# Patient Record
Sex: FEMALE | Race: BLACK OR AFRICAN AMERICAN | Employment: FULL TIME | URBAN - METROPOLITAN AREA
[De-identification: names, ages, dates, MRNs, and addresses within clinical notes are randomized per-mention and may not be internally consistent; named-entity substitution may affect disease eponyms.]

---

## 2017-06-21 ENCOUNTER — HOSPITAL ENCOUNTER (OUTPATIENT)
Dept: PERINATAL CARE | Age: 37
Discharge: HOME | End: 2017-06-21
Attending: OBSTETRICS & GYNECOLOGY

## 2019-09-26 ENCOUNTER — OFFICE VISIT (OUTPATIENT)
Dept: FAMILY MEDICINE CLINIC | Age: 39
End: 2019-09-26

## 2019-09-26 VITALS
OXYGEN SATURATION: 97 % | DIASTOLIC BLOOD PRESSURE: 72 MMHG | BODY MASS INDEX: 42.43 KG/M2 | HEART RATE: 114 BPM | TEMPERATURE: 98.7 F | RESPIRATION RATE: 20 BRPM | SYSTOLIC BLOOD PRESSURE: 123 MMHG | WEIGHT: 264 LBS | HEIGHT: 66 IN

## 2019-09-26 DIAGNOSIS — E66.01 OBESITY, MORBID (HCC): ICD-10-CM

## 2019-09-26 DIAGNOSIS — R25.1 TREMOR OF BOTH HANDS: ICD-10-CM

## 2019-09-26 DIAGNOSIS — R53.1 WEAKNESS: ICD-10-CM

## 2019-09-26 DIAGNOSIS — E28.2 PCOS (POLYCYSTIC OVARIAN SYNDROME): Primary | ICD-10-CM

## 2019-09-26 NOTE — PROGRESS NOTES
1. Have you been to the ER, urgent care clinic since your last visit? Hospitalized since your last visit? No    2. Have you seen or consulted any other health care providers outside of the [address] [place] [address] since your last visit? Include any pap smears or colon screening.  Dr. Loren Cavanaugh, endocrine and GYN    Chief Complaint   Patient presents with   [address]     PCP

## 2019-09-26 NOTE — PROGRESS NOTES
Chief Complaint   Patient presents with   [address]     PCP     she is a 44y.o. year old female who presents for evalution. Had a baby in march, had csection and hemorrhaged after. She does not think the hgb has been chjecked after discharge  Since then she started swelling in the legs, and noted both legs feel weak when she climbs stairs  She has pcos and at one time was on metformin. She is not now but feels like the blood sugar is low    Reviewed PmHx, RxHx, FmHx, SocHx, AllgHx and updated and dated in the chart. Aspirin yes ____   No____ N/A____    Patient Active Problem List    Diagnosis    Obesity, morbid ([address]    Prediabetes     A1c 5.7 in July 2016. Nurse notes were reviewed and copied and are correct  Review of Systems - negative except as listed above in the HPI    Objective:     Vitals:    09/26/19 1430   BP: 123/72   Pulse: (!) 114   Resp: 20   Temp: 98.7 °F (37.1 °C)   TempSrc: Oral   SpO2: 97%   Weight: 264 lb (119.7 kg)   Height: 5' 6\" (1.676 m)     Physical Examination: General appearance - alert, well appearing, and in no distress  Mental status - alert, oriented to person, place, and time  Eyes - pupils equal and reactive, extraocular eye movements intact  Chest - clear to auscultation, no wheezes, rales or rhonchi, symmetric air entry  Heart - normal rate, regular rhythm, normal S1, S2, no murmurs, rubs, clicks or gallops  Abdomen - soft, nontender, nondistended, no masses or organomegaly  Neurological - bilateral hand  is weak, but evenly weak  MS on biceps, triceps and hip girdle are all normal and symmetrical  Mild hand tremore bilaterally    Extremities - peripheral pulses normal, no pedal edema, no clubbing or cyanosis  Skin - normal coloration and turgor, no rashes, no suspicious skin lesions noted         Assessment/ Plan:   Diagnoses and all orders for this visit:    1. PCOS (polycystic ovarian syndrome)  -     TSH 3RD GENERATION    2.  Obesity, morbid ([address]  - HEMOGLOBIN A1C WITH EAG    3. Weakness  -     METABOLIC PANEL, COMPREHENSIVE  -     CBC WITH AUTOMATED DIFF  -     CRP, HIGH SENSITIVITY  -     SED RATE (ESR)    4. Tremor of both hands  -     MELISSA BY MULTIPLEX FLOW IA, QL  -     RHEUMATOID FACTOR, QL  -     CRP, HIGH SENSITIVITY  -     SED RATE (ESR)           ICD-10-CM ICD-9-CM    1. PCOS (polycystic ovarian syndrome) E28.2 256.4 TSH 3RD GENERATION      CANCELED: HEMOGLOBIN A1C WITH EAG   2. Obesity, morbid ([facility] E66.01 278.01 HEMOGLOBIN A1C WITH EAG      CANCELED: METABOLIC PANEL, COMPREHENSIVE   3. Weakness F82.7 422.38 METABOLIC PANEL, COMPREHENSIVE      CBC WITH AUTOMATED DIFF      CRP, HIGH SENSITIVITY      SED RATE (ESR)      CANCELED: TSH 3RD GENERATION      CANCELED: CBC WITH AUTOMATED DIFF      CANCELED: METABOLIC PANEL, COMPREHENSIVE      CANCELED: HEMOGLOBIN A1C WITH EAG      CANCELED: REFERRAL TO NEUROLOGY      CANCELED: MELISSA BY MULTIPLEX FLOW IA, QL      CANCELED: RHEUMATOID FACTOR, QL      CANCELED: CRP, HIGH SENSITIVITY      CANCELED: SED RATE (ESR)   4. Tremor of both hands R25.1 781.0 MELISSA BY MULTIPLEX FLOW IA, QL      RHEUMATOID FACTOR, QL      CRP, HIGH SENSITIVITY      SED RATE (ESR)      CANCELED: REFERRAL TO NEUROLOGY      CANCELED: MELISSA BY MULTIPLEX FLOW IA, QL      CANCELED: RHEUMATOID FACTOR, QL      CANCELED: CRP, HIGH SENSITIVITY      CANCELED: SED RATE (ESR)       I have discussed the diagnosis with the patient and the intended plan as seen in the above orders. The patient has received an after-visit summary and questions were answered concerning future plans. Medication Side Effects and Warnings were discussed with patient: yes  Patient Labs were reviewed and or requested: yes  Patient Past Records were reviewed and or requested: yes        There are no Patient Instructions on file for this visit.     The patient verbalizes understanding and agrees with the plan of care        Patient has the advanced directives booklet to review

## 2019-10-01 ENCOUNTER — TELEPHONE (OUTPATIENT)
Dept: FAMILY MEDICINE CLINIC | Age: 39
End: 2019-10-01

## 2019-10-01 NOTE — TELEPHONE ENCOUNTER
LVM advising pt I had received her labs this morning. However, provider is out of the office until Monday. Once provider is back in office I will have her review the results and then she will be contacted with recommendations.

## 2019-10-01 NOTE — TELEPHONE ENCOUNTER
----- Message from Jessica Orozco sent at 10/1/2019  1:46 PM EDT -----  Regarding: Dr. Wilman Jacob first and last name:      Reason for call:      Callback required yes/no and why:      Best contact number(s):  [phone]      Details to clarify the request:Pt wants to speak to someone reference her test results      Jessica Orozco

## 2019-10-01 NOTE — TELEPHONE ENCOUNTER
----- Message from Lesley Alicia sent at 10/1/2019  7:49 AM EDT -----  Regarding: FW: Dr. Cleveland Rao       ----- Message -----  From: Maria R Peraza  Sent: 9/30/2019   1:00 PM EDT  To: [facility] Front Office  Subject: Dr. Jenny Alejandra first and last name:      Reason for call:Pt is inquiring about lab results.       Callback required yes/no and why:Y      Best contact number(s):  [phone]    Details to clarify the request:      Tanisha Youngblood

## 2019-10-03 ENCOUNTER — TELEPHONE (OUTPATIENT)
Dept: FAMILY MEDICINE CLINIC | Age: 39
End: 2019-10-03

## 2019-10-03 NOTE — TELEPHONE ENCOUNTER
Pt is calling in regards to her lab results completed on 9/27/19. Pt had labs completed at [facility] so they are not in the system. CRP level is 6.1. Creatinine 0.45. Protein 6.0. ALK Phosphatase 126. ALT 31. TSH 0.01. WBC 2.7. Absolute Neutrophils 780. Rheumatod factor 28. Pt is concerned about abnormal levels. Please advise of recommendations and I will contact pt to advise.

## 2019-10-03 NOTE — TELEPHONE ENCOUNTER
LVM advising pt of Dr. Sachin Holley recommendation and to schedule for Monday, Oct. 7th at 2:30 pm to discuss in office and to have additional test completed. Requested pt contact office back to confirm date/time.

## 2019-10-03 NOTE — TELEPHONE ENCOUNTER
Please put her on the schedule somewhere for Monday. To write a result note would be too wordy. I want to have her come in so I can explain tell her nothing here is life threatening in the short term.  To avoid any confusion I would prefer to discuss in office visit

## 2019-10-16 ENCOUNTER — OFFICE VISIT (OUTPATIENT)
Dept: FAMILY MEDICINE CLINIC | Age: 39
End: 2019-10-16

## 2019-10-16 VITALS
SYSTOLIC BLOOD PRESSURE: 120 MMHG | HEIGHT: 66 IN | TEMPERATURE: 98.4 F | BODY MASS INDEX: 41.14 KG/M2 | OXYGEN SATURATION: 97 % | HEART RATE: 80 BPM | DIASTOLIC BLOOD PRESSURE: 79 MMHG | RESPIRATION RATE: 18 BRPM | WEIGHT: 256 LBS

## 2019-10-16 DIAGNOSIS — R76.8 ELEVATED RHEUMATOID FACTOR: ICD-10-CM

## 2019-10-16 DIAGNOSIS — R94.5 ABNORMAL LIVER FUNCTION: Primary | ICD-10-CM

## 2019-10-16 DIAGNOSIS — R76.8 ELEVATED ANTINUCLEAR ANTIBODY (ANA) LEVEL: ICD-10-CM

## 2019-10-16 RX ORDER — PROPRANOLOL HYDROCHLORIDE 20 MG/1
TABLET ORAL
Refills: 5 | COMMUNITY
Start: 2019-10-02 | End: 2020-12-23

## 2019-10-16 RX ORDER — METHIMAZOLE 10 MG/1
25 TABLET ORAL DAILY
Refills: 5 | COMMUNITY
Start: 2019-10-11

## 2019-10-16 NOTE — PROGRESS NOTES
Chief Complaint   Patient presents with    Abnormal Lab Results     she is a 44y.o. year old female who presents for evalution. She c/o legs feeling weak  The crp and RA were elevated  Also the liver function was abn  She does not drink ETOH but is 256 lbs    She has the h/o hashimotos, right now endocrine is in the process of doing another eval        Reviewed PmHx, RxHx, FmHx, SocHx, AllgHx and updated and dated in the chart. Aspirin yes ____   No____ N/A____    Patient Active Problem List    Diagnosis    Obesity, morbid ([address]    Prediabetes     A1c 5.7 in July 2016. Nurse notes were reviewed and copied and are correct  Review of Systems - negative except as listed above in the HPI    Objective:     Vitals:    10/16/19 1501   BP: 120/79   Pulse: 80   Resp: 18   Temp: 98.4 °F (36.9 °C)   TempSrc: Oral   SpO2: 97%   Weight: 256 lb (116.1 kg)   Height: 5' 6\" (1.676 m)       Physical Examination: General appearance - alert, well appearing, and in no distress  Mental status - alert, oriented to person, place, and time  Neck - supple, no significant adenopathy  Chest - clear to auscultation, no wheezes, rales or rhonchi, symmetric air entry  Heart - normal rate, regular rhythm, normal S1, S2, no murmurs, rubs, clicks or gallops  Abdomen - soft, nontender, nondistended, no masses or organomegaly  Neurological - alert, oriented, normal speech, no focal findings or movement disorder noted  Musculoskeletal - no joint tenderness, deformity or swelling  Extremities - peripheral pulses normal, no pedal edema, no clubbing or cyanosis  Skin - normal coloration and turgor, no rashes, no suspicious skin lesions noted      Assessment/ Plan:   Diagnoses and all orders for this visit:    1. Abnormal liver function  -     US ABD LTD; Future    2. Elevated rheumatoid factor  -     REFERRAL TO RHEUMATOLOGY  3. Elevated MELISSA   referral to rheumatology      ICD-10-CM ICD-9-CM    1.  Abnormal liver function R94.5 794.8 US ABD LTD   2. Abnormal liver function tests R94.5 790.6 REFERRAL TO RHEUMATOLOGY       I have discussed the diagnosis with the patient and the intended plan as seen in the above orders. The patient has received an after-visit summary and questions were answered concerning future plans. Medication Side Effects and Warnings were discussed with patient: yes  Patient Labs were reviewed and or requested: yes  Patient Past Records were reviewed and or requested: yes        There are no Patient Instructions on file for this visit.     The patient verbalizes understanding and agrees with the plan of care        Patient has the advanced directives booklet to review

## 2019-10-16 NOTE — PROGRESS NOTES
1. Have you been to the ER, urgent care clinic since your last visit? Hospitalized since your last visit? No    2. Have you seen or consulted any other health care providers outside of the [address] [place] [address] since your last visit? Include any pap smears or colon screening.  Dr. Blackwood Ranks endocrine    Chief Complaint   Patient presents with    Abnormal Lab Results

## 2019-10-23 ENCOUNTER — HOSPITAL ENCOUNTER (OUTPATIENT)
Dept: ULTRASOUND IMAGING | Age: 39
Discharge: HOME | End: 2019-10-23
Attending: FAMILY MEDICINE
Payer: OTHER GOVERNMENT

## 2019-10-23 DIAGNOSIS — R94.5 ABNORMAL LIVER FUNCTION: ICD-10-CM

## 2019-10-23 PROCEDURE — 76705 ECHO EXAM OF ABDOMEN: CPT

## 2020-01-10 ENCOUNTER — OFFICE VISIT (OUTPATIENT)
Dept: RHEUMATOLOGY | Age: 40
End: 2020-01-10

## 2020-01-10 VITALS
TEMPERATURE: 98.6 F | HEIGHT: 66 IN | BODY MASS INDEX: 39.86 KG/M2 | HEART RATE: 72 BPM | WEIGHT: 248 LBS | SYSTOLIC BLOOD PRESSURE: 113 MMHG | OXYGEN SATURATION: 98 % | RESPIRATION RATE: 18 BRPM | DIASTOLIC BLOOD PRESSURE: 64 MMHG

## 2020-01-10 DIAGNOSIS — M72.2 PLANTAR FASCIITIS: ICD-10-CM

## 2020-01-10 DIAGNOSIS — R74.01 TRANSAMINITIS: ICD-10-CM

## 2020-01-10 DIAGNOSIS — R76.8 RHEUMATOID FACTOR POSITIVE: ICD-10-CM

## 2020-01-10 DIAGNOSIS — G56.03 BILATERAL CARPAL TUNNEL SYNDROME: ICD-10-CM

## 2020-01-10 DIAGNOSIS — E05.00 GRAVES DISEASE: ICD-10-CM

## 2020-01-10 DIAGNOSIS — R76.8 RHEUMATOID FACTOR POSITIVE: Primary | ICD-10-CM

## 2020-01-10 DIAGNOSIS — D72.819 LEUKOPENIA, UNSPECIFIED TYPE: ICD-10-CM

## 2020-01-10 RX ORDER — DICLOFENAC SODIUM 10 MG/G
2 GEL TOPICAL 4 TIMES DAILY
Qty: 1 EACH | Refills: 5 | Status: SHIPPED | OUTPATIENT
Start: 2020-01-10

## 2020-01-10 NOTE — PATIENT INSTRUCTIONS
Please fill out your [address] you will receive after your visit in the mail or via [address]!

## 2020-01-10 NOTE — PROGRESS NOTES
REASON FOR VISIT    This is the initial evaluation for Ms. Luisa Shoemaker a 44 y.o.  female for question of elevated RF. The patient is referred to the [facility] at the request of PCP. HISTORY OF PRESENT ILLNESS     Previous medical records reviewed and summarized: yes    MHAQ:0.375  Pain scale: 1/10    This is a 44 y.o. female with hx of hashimoto,  in 3/2019. Patient notes in 2019 she had joint pain and muscle aches. Her doctor did some tests and a RF was elevated. She was recently diagnosed with Graves in 10/2019. That is now under control and she is not feeling very sick anymore. She takes methimazole daily. She notes she has carpal tunnel syndrome, dequervains tendonitis in right hand and she has had steroid shots for this in the past.  When she was pregnant, her hands were numb for last 3 months of pregnancy and they improved 6 weeks after pregnancy. She has pain in wrist, fingers b/l. She has had it for years but it is worse now. She notes her ankles are also painful. When she gets out of bed in the morning her ankles hurt as well. The pain improves after 1/2 hour of waking up. Anything she is sitting for a long time and she gets up her ankles and feet hurt. She has more pain in the evenings in hands/wrists. Hands are worse in the morning or at night. Ankles is the same thing. She has pain at the bottom of the foot when she steps down. When she first wakes up her hands feel numb then she has pins and needles kind of pain. When she had the injection her pain went away but eventually it came back. NO swelling in hands but ankles are always swollen. Ankles swelling worsens as the day goes on. She has not taken anything for the pain so far. Sitting and not moving makes the pain worse. Hand pain is just in the morning. Ankles are worse.    + dry eyes and she uses nothing. No blurry vision.    + lower back pain which is there when she wakes up in the morning. She has stiffness in the morning. Back pain does not wake her up from sleep      REVIEW OF SYSTEMS    A 15 point review of systems was performed and summarized below. The questionnaire was reviewed with the patient and scanned into the patient's medical record.     General: denies recent weight gain, recent weight loss, fatigue, weakness, fever, drenching night sweats  Musculoskeletal: endorsesjoint pain, joint swelling, morning stiffness (lasting 2 hours), muscle pain   denies   Ears: denies ringing in ears, hearing loss, deafness  Eyes: denies pain, light sensitive, redness, blindness, double vision, blurred vision, excess tearing, dryness, foreign body sensation  Mouth: denies sore tongue, oral ulcers, loss of taste, dryness, increased dental caries  Nose: denies nosebleeds, nasal ulcers  Throat: denies food stuck when swallowing, difficulty with swallowing, hoarseness, pain in jaw while chewing  Neck: denies swollen glands, tender glands  Cardiopulmonary: denies pain in chest with deep breaths, pain in chest when lying down, murmurs, sudden changes in heart beat, wheezing, dry cough, productive cough, shortness of breath at rest, shortness of breath on exertion, coughing of blood  Gastrointestinal:denies nausea, heartburn, stomach pain relieved by food, chronic constipation, chronic diarrhea, blood in stools, black stools  Genitourinary:denies vaginal dryness, pain or burning on urination, blood in urine, cloudy urine, vaginal ulcers, penile ulcers  Hematologic: denies anemia, bleeding tendency, blood clots, bleeding gums  Skin: denies easy bruising, hair loss, rash, rash worsened after sun exposure, hives/urticaria, skin thickening, skin tightness, nodules/bumps, color changes of hands or feet in the cold (Raynaud's)  Neurologic: denies numbness or tingling in hands, numbness or tingling in feet, muscle weakness  Psychiatric:  denies depression, excessive worries, PTSD, Bipolar  Sleep: denies poor sleep (4-5 hours), denies snoring, apnea, daytime somnolence, difficulty falling asleep, difficulty staying asleep     PAST MEDICAL HISTORY    Past Medical History:   Diagnosis Date    Hashimoto encephalopathy     Infertility, female     PCOS (polycystic ovarian syndrome)     Seasonal allergic rhinitis         Past Surgical History:   Procedure Laterality Date    HX APPENDECTOMY      HX  SECTION         FAMILY HISTORY    Family History   Problem Relation Age of Onset    Cancer Mother         breast    Heart Attack Father     High Cholesterol Father     Thyroid Disease Brother     Glaucoma Maternal Grandmother     Thyroid Disease Paternal Grandmother        SOCIAL HISTORY    Social History     Tobacco Use    Smoking status: Never Smoker    Smokeless tobacco: Never Used   Substance Use Topics    Alcohol use: Not Currently     Alcohol/week: 0.0 standard drinks    Drug use: No       MEDICATIONS    Current Outpatient Medications   Medication Sig Dispense Refill    diclofenac (VOLTAREN) 1 % gel Apply 2 g to affected area four (4) times daily. 1 Each 5    methIMAzole (TAPAZOLE) 10 mg tablet Take 10 mg by mouth two (2) times a day. 5    propranolol (INDERAL) 20 mg tablet TK 1 T PO BID  5    fexofenadine (ALLEGRA) 180 mg tablet Take  by mouth.  PV W-O JULIANE/FERROUS FUMARATE/FA (M-VIT PO) Take  by mouth. ALLERGIES    Allergies   Allergen Reactions    Codeine Other (comments)     Hallucination     Percocet [Oxycodone-Acetaminophen] Itching and Nausea and Vomiting    Hydrocodone Itching and Nausea and Vomiting    Ibuprofen Other (comments)     reflux    Pcn [Penicillins] Unknown (comments)       PHYSICAL EXAMINATION    Visit Vitals  /64 (BP 1 Location: Right arm, BP Patient Position: Sitting)   Pulse 72   Temp 98.6 °F (37 °C) (Oral)   Resp 18   Ht 5' 6\" (1.676 m)   Wt 248 lb (112.5 kg)   SpO2 98%   BMI 40.03 kg/m²     Body mass index is 40.03 kg/m².     General: NAD  HEENT: PERRL, anicteric, non-injected sclerae; oropharynx without ulcers, erythema, or exudate.   + dry eyes and dry mouth  Lymphatic: No cervical or axillary lymphadenopathy. Cardiovascular: S1, S2,no R/M/G  Pulmonary: CTA b/l. No wheezes/rales/rhonchi. Abdominal: Soft,NTND, + BS. Skin: No rash, nodules, or periungual changes. Neuro: Alert; able to carry normal conversation    Musculoskeletal:   NO swollen joints  B/l ankles R>L with pitting edema  MELISSA negative   TTP of lower lumbar spine in the middle    DATA REVIEW    Prior medical records were reviewed and if applicable are summarized as below:    Labs:   1/2020: WBC 4.1, Plt 411, Hb 13.4, Alk phos 228, ALT 32  9/2019: CRP 6.1, Alk phos 126, ALT 31, TSH low, ESR normal, MELISSA negative, WBC 2.7, RF positive    Imaging:   N/A    ASSESSMENT AND PLAN    A 44 y.o. female with hx of graves disease presents for evaluation of a positive RF. The patient's hand pain is partly due to CTS and her foot pain is due to plantar fasciitis. She has no synovitis but has some features of inflammatory pain. This along with positive RF raises suspicion for inflammatory arthritis. I will also evaluate her for other causes of positive RF along with her transaminitis. # Positive RF:  - CCP, SSA/B, chronic hepatitis panel    # Arthralgias:  - xrays of hands and feet  - SI joint x-ray  - HLA B27, JO1 given back stiffness  - diclofenac gel    # Transaminitis:  - defer to PCP  - will do ASMA/ anti-mitochondrial antibody    # Plantar fasciitis:  - showed her exercises to do    # graves disease:  - on methimazole    RTC in 4 weeks    The patient voiced understanding of the aforementioned assessment and plan. Summary of plan was provided in the After Visit Summary patient instructions. I also provided education about MyChart setup and utility. Ms. Hailey Almodovar has a reminder for a \"due or due soon\" health maintenance.  I have asked that she contact her primary care provider for follow-up on this health maintenance. TODAY'S ORDERS    Orders Placed This Encounter    XR HAND RT MIN 3 V    XR HAND LT MIN 3 V    XR FOOT LT MIN 3 V    XR FOOT RT MIN 3 V    XR SI JTS MIN 3 V    CYCLIC CITRUL PEPTIDE AB, IGG    C REACTIVE PROTEIN, QT    DSDNA (NDNA) SCRN BY JULIET    CHRONIC HEPATITIS PANEL    HLA-B27    ANTI-SMOOTH MUSCLE/MITOCHOND.  SJOGREN'S ABS, SSA AND SSB    CYCLIC CITRUL PEPTIDE AB, IGG    CHRONIC HEPATITIS PANEL    OTIS-1 AB    ANTI-SMOOTH MUSCLE/MITOCHOND.     SJOGREN'S ABS, SSA AND SSB    diclofenac (VOLTAREN) 1 % gel       Future Appointments   Date Time Provider Gunjan Rivera   2/7/2020  3:40 PM Magda Morales  Berta Hamilton MD    Adult Rheumatology   [facility]  [facility], [address] [place] [address]   Phone [phone]  Fax [phone]

## 2020-01-13 LAB
CCP ANTIBODY IGG,99138601: <16 UNITS
CONFIRMATION, 55019600: NORMAL
ENA SS-A AB SER-ACNC: NORMAL AI
ENA SS-B AB SER-ACNC: NORMAL AI
HEP B CORE AB, IGM,HBCM: NORMAL
HEP B SURFACE AG,HBSAG: NORMAL
HEPATITIS A AB, IGM,HAAB: NORMAL
HEPATITIS C AB,HCAB: NORMAL
HLA-B27 ANTIGEN: NEGATIVE
JO-1 AB, RJO1T: NORMAL AI
Lab: NEGATIVE
MITOCHONDRIAL AB SCREEN: NEGATIVE
SIGNAL TO CUTOFF (HEP C), 619802: 0.02

## 2020-01-20 ENCOUNTER — DOCUMENTATION ONLY (OUTPATIENT)
Dept: RHEUMATOLOGY | Age: 40
End: 2020-01-20

## 2020-01-20 NOTE — PROGRESS NOTES
LVM for patient to return call. Diclonfenac gel has been denied by [employer].  Pt may use OTC topical cream or gel per Dr. Colonel Wu.

## 2020-02-07 ENCOUNTER — OFFICE VISIT (OUTPATIENT)
Dept: RHEUMATOLOGY | Age: 40
End: 2020-02-07

## 2020-02-07 VITALS
HEIGHT: 66 IN | OXYGEN SATURATION: 98 % | WEIGHT: 228.8 LBS | HEART RATE: 87 BPM | BODY MASS INDEX: 36.77 KG/M2 | TEMPERATURE: 98.8 F | SYSTOLIC BLOOD PRESSURE: 106 MMHG | DIASTOLIC BLOOD PRESSURE: 79 MMHG | RESPIRATION RATE: 20 BRPM

## 2020-02-07 DIAGNOSIS — E05.00 GRAVES DISEASE: ICD-10-CM

## 2020-02-07 DIAGNOSIS — R74.01 TRANSAMINITIS: ICD-10-CM

## 2020-02-07 DIAGNOSIS — M72.2 PLANTAR FASCIITIS: ICD-10-CM

## 2020-02-07 DIAGNOSIS — G56.03 BILATERAL CARPAL TUNNEL SYNDROME: ICD-10-CM

## 2020-02-07 DIAGNOSIS — R76.8 RHEUMATOID FACTOR POSITIVE: Primary | ICD-10-CM

## 2020-02-07 RX ORDER — CHOLECALCIFEROL (VITAMIN D3) 125 MCG
5000 CAPSULE ORAL DAILY
COMMUNITY

## 2020-02-07 NOTE — PROGRESS NOTES
Chief Complaint   Patient presents with    Joint Pain     1. Have you been to the ER, urgent care clinic since your last visit? Hospitalized since your last visit? No    2. Have you seen or consulted any other health care providers outside of the [address] [place] [address] since your last visit? Include any pap smears or colon screening.  Yes Where: dr Abiodun castillo

## 2020-02-07 NOTE — PATIENT INSTRUCTIONS
Please fill out your [address] you will receive after your visit in the mail or via [phone]!

## 2020-02-07 NOTE — PROGRESS NOTES
REASON FOR VISIT    Patient presents for a follow up visit for    ICD-10-CM ICD-9-CM    1. Rheumatoid factor positive R76.8 795.79    2. Bilateral carpal tunnel syndrome G56.03 354.0    3. Graves disease E05.00 242.00    4. Transaminitis R74.0 790.4    5. Plantar fasciitis M72.2 728.71          HISTORY OF PRESENT ILLNESS     Previous medical records reviewed and summarized: yes    MHAQ:0  Pain scale: 1/10  The patient notes she feels well today. Her thyroid dose was increased again. Her LFTs are abnormal due to her methimazole per the endocrine. She got her x-rays done but I do not have the results.      REVIEW OF SYSTEMS    Positives as per history  Negatives as follows:  [place]:    Denies unexplained persistent fevers, weight change, chronic fatigue  HEAD/EYES:              Denies eye redness, blurry vision or sudden loss of vision, dry eyes, HA, temporal artery pain  ENT:                            Denies oral/nasal ulcers, recurrent sinus infections, dry mouth  RESPIRATORY:         No pleuritic pain, history of pleural effusions, hemoptysis, exertional dyspnea  CARDIOVASCULAR:             Denies chest pain, history of pericardial effusions  GASTRO:                    Denies heartburn, esophageal dysmotility, abdominal pain, nausea, vomiting, diarrhea, blood in the stool  HEMATOLOGIC:        No easy bruising, purpura, swollen lymph nodes  SKIN:                           Denies alopecia, ulcers, nodules, sun sensitivity, unexplained persistent rash   VASCULAR:                Denies edema, cyanosis, raynaud phenomenon  NEUROLOGIC:           Denies specific muscle weakness, paresthesias   PSYCHIATRIC:           No sleep disturbance / snoring, depression, anxiety  MSK:                           No morning stiffness >1 hour, SI joint pain, persistent joint swelling, persistent joint pain    PAST MEDICAL HISTORY    Past Medical History:   Diagnosis Date    Hashimoto encephalopathy     Infertility, female  PCOS (polycystic ovarian syndrome)     Seasonal allergic rhinitis         Past Surgical History:   Procedure Laterality Date    HX APPENDECTOMY      HX  SECTION         FAMILY HISTORY    Family History   Problem Relation Age of Onset    Cancer Mother         breast    Heart Attack Father     High Cholesterol Father     Thyroid Disease Brother     Glaucoma Maternal Grandmother     Thyroid Disease Paternal Grandmother        SOCIAL HISTORY    Social History     Tobacco Use    Smoking status: Never Smoker    Smokeless tobacco: Never Used   Substance Use Topics    Alcohol use: Not Currently     Alcohol/week: 0.0 standard drinks    Drug use: No       MEDICATIONS    Current Outpatient Medications   Medication Sig Dispense Refill    cholecalciferol, vitamin D3, (VITAMIN D3) 50 mcg (2,000 unit) tab Take  by mouth.  diclofenac (VOLTAREN) 1 % gel Apply 2 g to affected area four (4) times daily. 1 Each 5    methIMAzole (TAPAZOLE) 10 mg tablet Take 10 mg by mouth two (2) times a day. 5    propranolol (INDERAL) 20 mg tablet TK 1 T PO BID  5    fexofenadine (ALLEGRA) 180 mg tablet Take  by mouth.  PV W-O JULIANE/FERROUS FUMARATE/FA (M-VIT PO) Take  by mouth. ALLERGIES    Allergies   Allergen Reactions    Codeine Other (comments)     Hallucination     Percocet [Oxycodone-Acetaminophen] Itching and Nausea and Vomiting    Hydrocodone Itching and Nausea and Vomiting    Ibuprofen Other (comments)     reflux    Pcn [Penicillins] Unknown (comments)       PHYSICAL EXAMINATION    Visit Vitals  /79 (BP 1 Location: Left arm, BP Patient Position: Sitting)   Pulse 87   Temp 98.8 °F (37.1 °C) (Oral)   Resp 20   Ht 5' 6\" (1.676 m)   Wt 228 lb 12.8 oz (103.8 kg)   SpO2 98%   BMI 36.93 kg/m²     Body mass index is 36.93 kg/m².     General: NAD  HEENT: PERRL, anicteric, non-injected sclerae; oropharynx without ulcers, erythema, or exudate.   + dry eyes and dry mouth  Lymphatic: No cervical or axillary lymphadenopathy. Cardiovascular: S1, S2,no R/M/G  Pulmonary: CTA b/l. No wheezes/rales/rhonchi. Abdominal: Soft,NTND, + BS. Skin: No rash, nodules, or periungual changes. Neuro: Alert; able to carry normal conversation    Musculoskeletal:   NO swollen joints    DATA REVIEW    Prior medical records were reviewed and if applicable are summarized as below:    Labs:   1/2020: SSA/B JO1, CCP, HLA B27, hepatitis panel negative  1/2020: WBC 4.1, Plt 411, Hb 13.4, Alk phos 228, ALT 32  9/2019: CRP 6.1, Alk phos 126, ALT 31, TSH low, ESR normal, MELISSA negative, WBC 2.7, RF positive    Imaging:   N/A    ASSESSMENT AND PLAN    A 44 y.o. female with hx of graves disease presents for evaluation of a positive RF. The patient's work up for a rheumatologic etiology is unremarkable. Her positive RF is likely due to her thyroid issues. As her thyroid levels are improving, her symptoms are slowly improving as well. # Positive RF:  - likely 2/2 graves    # Arthralgias:  - improving now that thyroid issues are improving    # Transaminitis:  - defer to PCP    # Plantar fasciitis:  - showed her exercises to do    # graves disease:  - on methimazole    RTC in PRN    The patient voiced understanding of the aforementioned assessment and plan. Summary of plan was provided in the After Visit Summary patient instructions. I also provided education about MyChart setup and utility. Ms. Mariusz Snyder has a reminder for a \"due or due soon\" health maintenance. I have asked that she contact her primary care provider for follow-up on this health maintenance. TODAY'S ORDERS    Orders Placed This Encounter    cholecalciferol, vitamin D3, (VITAMIN D3) 50 mcg (2,000 unit) tab       No future appointments.     Buddy Hi MD    Adult Rheumatology   [facility]  A Part of [address]   Phone [phone]  Fax [phone]

## 2020-02-10 ENCOUNTER — PATIENT MESSAGE (OUTPATIENT)
Dept: RHEUMATOLOGY | Age: 40
End: 2020-02-10

## 2020-02-10 ENCOUNTER — DOCUMENTATION ONLY (OUTPATIENT)
Dept: RHEUMATOLOGY | Age: 40
End: 2020-02-10

## 2020-02-10 NOTE — PROGRESS NOTES
SI joint x-rays (1/2020): There is some mild widening along the synovial portion of the SI joints. On the oblique projections there is a suggestion of erosive disease involving the ileal margin of the left SI joint. There is questionable mild erosive disease of the ileal margin in the right SI joint. The sacral margins of the SI joint are normal.  This examination is negative for bridging osteophyte formation or significant reactive subchondral sclerosis. This examination is negative for chondrocalcinosis. Hand x-rays: Normal    Foot x-rays: Small bone spur involving the posterior os calcis.

## 2020-02-19 ENCOUNTER — OFFICE VISIT (OUTPATIENT)
Dept: RHEUMATOLOGY | Age: 40
End: 2020-02-19

## 2020-02-19 VITALS
SYSTOLIC BLOOD PRESSURE: 117 MMHG | TEMPERATURE: 98.6 F | DIASTOLIC BLOOD PRESSURE: 82 MMHG | HEART RATE: 64 BPM | OXYGEN SATURATION: 98 % | BODY MASS INDEX: 40.66 KG/M2 | RESPIRATION RATE: 20 BRPM | WEIGHT: 253 LBS | HEIGHT: 66 IN

## 2020-02-19 DIAGNOSIS — E05.00 GRAVES DISEASE: ICD-10-CM

## 2020-02-19 DIAGNOSIS — M45.9 ANKYLOSING SPONDYLITIS, UNSPECIFIED SITE OF SPINE (HCC): ICD-10-CM

## 2020-02-19 DIAGNOSIS — Z79.60 LONG-TERM USE OF IMMUNOSUPPRESSANT MEDICATION: Primary | ICD-10-CM

## 2020-02-19 DIAGNOSIS — R76.8 RHEUMATOID FACTOR POSITIVE: ICD-10-CM

## 2020-02-19 NOTE — PATIENT INSTRUCTIONS
Please fill out your [address] you will receive after your visit in the mail or via [phone]!

## 2020-02-19 NOTE — PROGRESS NOTES
REASON FOR VISIT    Patient presents for a follow up visit for    ICD-10-CM ICD-9-CM    1. Long-term use of immunosuppressant medication Z79.899 V58.69 XR CHEST PA LAT      QUANTIFERON-TB GOLD PLUS      CANCELED: QUANTIFERON-TB GOLD PLUS   2. Rheumatoid factor positive R76.8 795.79    3. Graves disease E05.00 242.00    4. Ankylosing spondylitis, unspecified site of spine ([facility] M45.9 720.0          HISTORY OF PRESENT ILLNESS     Previous medical records reviewed and summarized: yes    MHAQ:0  Pain scale: 0/10  The patient returns today to discuss her blood work results. She notes she have morning stiffness in hands for a few hours.      REVIEW OF SYSTEMS    Positives as per history  Negatives as follows:  [place]:    Denies unexplained persistent fevers, weight change, chronic fatigue  HEAD/EYES:              Denies eye redness, blurry vision or sudden loss of vision, dry eyes, HA, temporal artery pain  ENT:                            Denies oral/nasal ulcers, recurrent sinus infections, dry mouth  RESPIRATORY:         No pleuritic pain, history of pleural effusions, hemoptysis, exertional dyspnea  CARDIOVASCULAR:             Denies chest pain, history of pericardial effusions  GASTRO:                    Denies heartburn, esophageal dysmotility, abdominal pain, nausea, vomiting, diarrhea, blood in the stool  HEMATOLOGIC:        No easy bruising, purpura, swollen lymph nodes  SKIN:                           Denies alopecia, ulcers, nodules, sun sensitivity, unexplained persistent rash   VASCULAR:                Denies edema, cyanosis, raynaud phenomenon  NEUROLOGIC:           Denies specific muscle weakness, paresthesias   PSYCHIATRIC:           No sleep disturbance / snoring, depression, anxiety  MSK:                           No morning stiffness >1 hour, SI joint pain, persistent joint swelling, persistent joint pain    PAST MEDICAL HISTORY    Past Medical History:   Diagnosis Date    Hashimoto encephalopathy     Infertility, female     PCOS (polycystic ovarian syndrome)     Seasonal allergic rhinitis         Past Surgical History:   Procedure Laterality Date    HX APPENDECTOMY      HX  SECTION         FAMILY HISTORY    Family History   Problem Relation Age of Onset    Cancer Mother         breast    Heart Attack Father     High Cholesterol Father     Thyroid Disease Brother     Glaucoma Maternal Grandmother     Thyroid Disease Paternal Grandmother        SOCIAL HISTORY    Social History     Tobacco Use    Smoking status: Never Smoker    Smokeless tobacco: Never Used   Substance Use Topics    Alcohol use: Not Currently     Alcohol/week: 0.0 standard drinks    Drug use: No       MEDICATIONS    Current Outpatient Medications   Medication Sig Dispense Refill    adalimumab (HUMIRA,CF, PEN) 40 mg/0.4 mL injection pen 0.4 mL by SubCUTAneous route every fourteen (14) days. 2 Kit 3    cholecalciferol, vitamin D3, (VITAMIN D3) 50 mcg (2,000 unit) tab Take  by mouth.  diclofenac (VOLTAREN) 1 % gel Apply 2 g to affected area four (4) times daily. 1 Each 5    methIMAzole (TAPAZOLE) 10 mg tablet Take 10 mg by mouth two (2) times a day. 5    propranolol (INDERAL) 20 mg tablet TK 1 T PO BID  5    fexofenadine (ALLEGRA) 180 mg tablet Take  by mouth.  PV W-O JULIANE/FERROUS FUMARATE/FA (M-VIT PO) Take  by mouth. ALLERGIES    Allergies   Allergen Reactions    Codeine Other (comments)     Hallucination     Percocet [Oxycodone-Acetaminophen] Itching and Nausea and Vomiting    Hydrocodone Itching and Nausea and Vomiting    Ibuprofen Other (comments)     reflux    Pcn [Penicillins] Unknown (comments)       PHYSICAL EXAMINATION    Visit Vitals  /82 (BP 1 Location: Left arm, BP Patient Position: Sitting)   Pulse 64   Temp 98.6 °F (37 °C) (Oral)   Resp 20   Ht 5' 6\" (1.676 m)   Wt 253 lb (114.8 kg)   SpO2 98%   BMI 40.84 kg/m²     Body mass index is 40.84 kg/m².     General: NAD  HEENT: PERRL, anicteric, non-injected sclerae; oropharynx without ulcers, erythema, or exudate. Lymphatic: No cervical or axillary lymphadenopathy. Cardiovascular: S1, S2,no R/M/G  Pulmonary: CTA b/l. No wheezes/rales/rhonchi. Abdominal: Soft,NTND, + BS. Skin: No rash, nodules, or periungual changes. Neuro: Alert; able to carry normal conversation    Musculoskeletal:   NO swollen joints    DATA REVIEW    Prior medical records were reviewed and if applicable are summarized as below:    Labs:   1/2020: SSA/B JO1, CCP, HLA B27, hepatitis panel negative  1/2020: WBC 4.1, Plt 411, Hb 13.4, Alk phos 228, ALT 32  9/2019: CRP 6.1, Alk phos 126, ALT 31, TSH low, ESR normal, MELISSA negative, WBC 2.7, RF positive    Imaging:   SI joint x-rays (1/2020): There is some mild widening along the synovial portion of the SI joints. On the oblique projections there is a suggestion of erosive disease involving the ileal margin of the left SI joint. There is questionable mild erosive disease of the ileal margin in the right SI joint. The sacral margins of the SI joint are normal.  This examination is negative for bridging osteophyte formation or significant reactive subchondral sclerosis. This examination is negative for chondrocalcinosis.     Hand x-rays: Normal     Foot x-rays: Small bone spur involving the posterior os calcis. ASSESSMENT AND PLAN    A 44 y.o. female with hx of graves disease presents for evaluation of a positive RF. The patient's x-ray results are consistent with ankylosing spondylitis. I explained the diagnosis to the patient in detail and answered all the questions. # Ankylosing spondylitis:  - will start patient on Humira 40 mg subQ every other week.  I explained the risks and benefits of Humira and answered all the questions in detail.   - order submitted to [facility] today    # Transaminitis:  - defer to PCP  - 2/2  methimazoshashank    # Plantar fasciitis:  - showed her exercises to do    # graves disease:  - on methimazole    # Medication Toxicity Monitoring:  - cbc, cmp every 3 months  - Hepatitis B, C:  Negative 1/2020  - Quant gold:  Ordered today. CXR ordered as well  - Immunizations: We discussed receiving influenza, Prevar-13, and Pneumovax-23 vaccines as per the CDC guidelines for immunosuppressed patients. - bone health: Discuss at next visit    RTC in 3 months    The patient voiced understanding of the aforementioned assessment and plan. Summary of plan was provided in the After Visit Summary patient instructions. I also provided education about MyChart setup and utility. Ms. Natalie Church has a reminder for a \"due or due soon\" health maintenance. I have asked that she contact her primary care provider for follow-up on this health maintenance.     TODAY'S ORDERS    Orders Placed This Encounter    QUANTIFERON-TB GOLD PLUS    XR CHEST PA LAT    adalimumab (HUMIRA,CF, PEN) 40 mg/0.4 mL injection pen       Future Appointments   Date Time Provider Gunjan Rivera   5/19/2020  2:40 PM Dawn Cogan, MD [facility] Berta Hamilton MD    Adult Rheumatology   [facility]  A Part of [facility] [place], [facility], [address] [place] [address]   Phone [phone]  Fax [phone]

## 2020-02-19 NOTE — PROGRESS NOTES
Chief Complaint   Patient presents with    Follow-up     hands, ankles     1. Have you been to the ER, urgent care clinic since your last visit? Hospitalized since your last visit? No    2. Have you seen or consulted any other health care providers outside of the [address] [place] [address] since your last visit? Include any pap smears or colon screening.  No

## 2020-03-02 DIAGNOSIS — Z79.899 ENCOUNTER FOR LONG-TERM (CURRENT) USE OF OTHER MEDICATIONS: Primary | ICD-10-CM

## 2020-03-03 ENCOUNTER — DOCUMENTATION ONLY (OUTPATIENT)
Dept: RHEUMATOLOGY | Age: 40
End: 2020-03-03

## 2020-03-03 NOTE — PROGRESS NOTES
Pt's TB test came back positive. Orders given per Dr. Sherrie Summers for patient to repeat test. Orders placed in mail today. LVM for patient to call office regarding labs.

## 2020-03-18 ENCOUNTER — DOCUMENTATION ONLY (OUTPATIENT)
Dept: RHEUMATOLOGY | Age: 40
End: 2020-03-18

## 2020-03-18 ENCOUNTER — PATIENT MESSAGE (OUTPATIENT)
Dept: RHEUMATOLOGY | Age: 40
End: 2020-03-18

## 2020-03-18 NOTE — PROGRESS NOTES
I called the patient and left a voicemail stating that I need her to do the second test for Tb before starting the Humira. Only if the test is negative would we do the Humira. Until then, she is to not take the Humira.

## 2020-03-25 DIAGNOSIS — R76.8 RHEUMATOID FACTOR POSITIVE: ICD-10-CM

## 2020-03-25 DIAGNOSIS — D72.819 LEUKOPENIA, UNSPECIFIED TYPE: ICD-10-CM

## 2020-03-25 DIAGNOSIS — E05.00 GRAVES DISEASE: ICD-10-CM

## 2020-04-13 RX ORDER — RIFAMPIN 300 MG/1
600 CAPSULE ORAL DAILY
Qty: 60 CAP | Refills: 4 | Status: SHIPPED | OUTPATIENT
Start: 2020-04-13 | End: 2020-12-23

## 2020-04-15 DIAGNOSIS — R74.01 TRANSAMINITIS: Primary | ICD-10-CM

## 2020-05-11 RX ORDER — CELECOXIB 50 MG/1
50 CAPSULE ORAL 2 TIMES DAILY
Qty: 60 CAP | Refills: 2 | Status: SHIPPED | OUTPATIENT
Start: 2020-05-11 | End: 2020-07-21 | Stop reason: SDUPTHER

## 2020-05-11 NOTE — PROGRESS NOTES
I called the patient since she has been having issues with rifampin. She has had severe headaches which started after she started rifampin. They were controlled with splitting the dose of rifampin initially but yesterday she had a severe headache and she couldn't get out of bed. I explained to the patient that this could be a side effect of the rifampin. But there are many etiologies of headaches. She agreed. At this time, I will send celebrex to the pharmacy (NSAIDs not tolerated due to GERD) which she will take twice a day. She will also try to get an eye exam, check her BP and contact her PCP to work up other etiologies of headaches as well. I will contact Dr. Sara Bates as well to discuss alternate regimens for latent Tb. She is on methimazole and has had liver abnormalities so INH is contraindicated. She also told me today that she has re-started breast feeding but only at night 2 minutes. I urged her to discontinue this immediately as rifampin can have adverse effects with breastfeeding. She noted understanding.

## 2020-05-12 ENCOUNTER — PATIENT MESSAGE (OUTPATIENT)
Dept: RHEUMATOLOGY | Age: 40
End: 2020-05-12

## 2020-05-19 ENCOUNTER — VIRTUAL VISIT (OUTPATIENT)
Dept: RHEUMATOLOGY | Age: 40
End: 2020-05-19

## 2020-05-19 DIAGNOSIS — E05.00 GRAVES DISEASE: ICD-10-CM

## 2020-05-19 DIAGNOSIS — Z79.60 LONG-TERM USE OF IMMUNOSUPPRESSANT MEDICATION: Primary | ICD-10-CM

## 2020-05-19 DIAGNOSIS — M45.9 ANKYLOSING SPONDYLITIS, UNSPECIFIED SITE OF SPINE (HCC): ICD-10-CM

## 2020-05-19 DIAGNOSIS — Z22.7 LATENT TUBERCULOSIS: ICD-10-CM

## 2020-05-19 NOTE — PATIENT INSTRUCTIONS
Please fill out your [address] you will receive after your visit in the mail or via [phone]!

## 2020-05-19 NOTE — PROGRESS NOTES
REASON FOR VISIT    Hellen Collins is a 44 y.o. female who was seen by synchronous (real-time) audio-video technology on 5/19/2020. HISTORY OF DISEASE: Ankylosing spondylitis    Year of diagnosis: 2020  First visit with me: 1/2020  Cumulative disease manifestations: SI joint erosions  Positive serologies/labs:  Negative serologies/labs: Other co-morbidities: quantiferon gold positive  Relapses:      Past treatment history:  Prednisone:   Non-biologic DMARD:   Biologic:  Other: ON Rifampin since 4/2020 for latent Tb       HISTORY OF PRESENT ILLNESS     Previous medical records reviewed and summarized: yes    The patient notes she is doing well. She is at work. Celebrex is helping with the rifampin. Headaches are gone with it. Yesterday she missed rifampin as the pharmacy shipment was delayed. She is still breast feeding. I have informed the patient to not breast feeding while on rifampin. Even the dizziness and ear fullness resolved with celebrex. She has not started the Humira. Her lower back was popping today.       REVIEW OF SYSTEMS    Positives as per history  Negatives as follows:  Debbie Carbon:    Denies unexplained persistent fevers, weight change, chronic fatigue  HEAD/EYES:              Denies eye redness, blurry vision or sudden loss of vision, dry eyes, HA, temporal artery pain  ENT:                            Denies oral/nasal ulcers, recurrent sinus infections, dry mouth  RESPIRATORY:         No pleuritic pain, history of pleural effusions, hemoptysis, exertional dyspnea  CARDIOVASCULAR:             Denies chest pain, history of pericardial effusions  GASTRO:                    Denies heartburn, esophageal dysmotility, abdominal pain, nausea, vomiting, diarrhea, blood in the stool  HEMATOLOGIC:        No easy bruising, purpura, swollen lymph nodes  SKIN:                           Denies alopecia, ulcers, nodules, sun sensitivity, unexplained persistent rash   VASCULAR: Denies edema, cyanosis, raynaud phenomenon  NEUROLOGIC:           Denies specific muscle weakness, paresthesias   PSYCHIATRIC:           No sleep disturbance / snoring, depression, anxiety  MSK:                           No morning stiffness >1 hour, SI joint pain, persistent joint swelling, persistent joint pain    PAST MEDICAL HISTORY    Past Medical History:   Diagnosis Date    Hashimoto encephalopathy     Infertility, female     PCOS (polycystic ovarian syndrome)     Seasonal allergic rhinitis         Past Surgical History:   Procedure Laterality Date    HX APPENDECTOMY      HX  SECTION         FAMILY HISTORY    Family History   Problem Relation Age of Onset    Cancer Mother         breast    Heart Attack Father     High Cholesterol Father     Thyroid Disease Brother     Glaucoma Maternal Grandmother     Thyroid Disease Paternal Grandmother        SOCIAL HISTORY    Social History     Tobacco Use    Smoking status: Never Smoker    Smokeless tobacco: Never Used   Substance Use Topics    Alcohol use: Not Currently     Alcohol/week: 0.0 standard drinks    Drug use: No       MEDICATIONS    Current Outpatient Medications   Medication Sig Dispense Refill    celecoxib (CELEBREX) 50 mg capsule Take 1 Cap by mouth two (2) times a day for 90 days. 60 Cap 2    rifAMPin (RIFADIN) 300 mg capsule Take 2 Caps by mouth daily. 60 Cap 4    cholecalciferol, vitamin D3, (VITAMIN D3) 50 mcg (2,000 unit) tab Take  by mouth.  diclofenac (VOLTAREN) 1 % gel Apply 2 g to affected area four (4) times daily. 1 Each 5    methIMAzole (TAPAZOLE) 10 mg tablet Take 25 mg by mouth daily. 5    fexofenadine (ALLEGRA) 180 mg tablet Take  by mouth.  adalimumab (HUMIRA,CF, PEN) 40 mg/0.4 mL injection pen 0.4 mL by SubCUTAneous route every fourteen (14) days. 2 Kit 3    propranolol (INDERAL) 20 mg tablet TK 1 T PO BID  5    PV W-O JULIANE/FERROUS FUMARATE/FA (M-VIT PO) Take  by mouth. ALLERGIES    Allergies   Allergen Reactions    Codeine Other (comments)     Hallucination     Percocet [Oxycodone-Acetaminophen] Itching and Nausea and Vomiting    Hydrocodone Itching and Nausea and Vomiting    Ibuprofen Other (comments)     reflux    Pcn [Penicillins] Unknown (comments)       PHYSICAL EXAMINATION    General: NAD, looks well, normal respiratory effort  HEENT: PERRL, anicteric, non-injected sclerae; lids without inflammation  Pulmonary: Normal respirations, no retractions, coughing  Skin: grossly normal via video call  Neuro: Alert; able to carry normal conversation, cognition, affect normal    Musculoskeletal:   Grossly normal hands    Due to this being a TeleHealth evaluation, many elements of the physical examination are unable to be assessed. DATA REVIEW    Prior medical records were reviewed and if applicable are summarized as below:    Labs:   2/2020: quantiferon gold positive  1/2020: SSA/B JO1, CCP, HLA B27, hepatitis panel negative  1/2020: WBC 4.1, Plt 411, Hb 13.4, Alk phos 228, ALT 32  9/2019: CRP 6.1, Alk phos 126, ALT 31, TSH low, ESR normal, MELISSA negative, WBC 2.7, RF positive    Imaging:   SI joint x-rays (1/2020): There is some mild widening along the synovial portion of the SI joints. On the oblique projections there is a suggestion of erosive disease involving the ileal margin of the left SI joint. There is questionable mild erosive disease of the ileal margin in the right SI joint. The sacral margins of the SI joint are normal.  This examination is negative for bridging osteophyte formation or significant reactive subchondral sclerosis. This examination is negative for chondrocalcinosis.     Hand x-rays: Normal     Foot x-rays: Small bone spur involving the posterior os calcis. ASSESSMENT AND PLAN    A 44 y.o. female with hx of graves disease, ankylosing spondylitis, latent tuberculosis on rifampin daily presents for a virtual visit.  The patient is finally able to tolerate the rifampin with the celebrex. I urged her again to stop breast feeding as this can be harmful to the baby. She noted she is doing it very sporadically and a small amount. I will start Humira after 1 more month of treatment for latent Tb    # Latent tuberculosis:  - on rifampin 300 mg oral daily. - needs 3 more months of therapy    # Ankylosing spondylitis:  - start Humira next month  - will have patient come in for teaching  - order submitted to [facility] today    # Transaminitis:  - repeat blood work today  - 2/2  methimazoke    # Plantar fasciitis:  - showed her exercises to do    # graves disease:  - on methimazole    # Medication Toxicity Monitoring:  - cbc, cmp today  - Hepatitis B, C:  Negative 1/2020  - Immunizations: We discussed receiving influenza, Prevar-13, and Pneumovax-23 vaccines as per the CDC guidelines for immunosuppressed patients. - bone health: Discuss at next visit    RTC in 1 months via virtual visit    The patient voiced understanding of the aforementioned assessment and plan. Summary of plan was provided in the After Visit Summary patient instructions. I also provided education about MyChart setup and utility. Ms. Renzo Lainez has a reminder for a \"due or due soon\" health maintenance. I have asked that she contact her primary care provider for follow-up on this health maintenance. TODAY'S ORDERS    Orders Placed This Encounter    CBC WITH AUTOMATED DIFF    METABOLIC PANEL, COMPREHENSIVE    REFERRAL TO PHYSICAL THERAPY       No future appointments. We discussed the expected course, resolution and complications of the diagnosis(es) in detail. Medication risks, benefits, costs, interactions, and alternatives were discussed as indicated. I advised her to contact the office if her condition worsens, changes or fails to improve as anticipated. She expressed understanding with the diagnosis(es) and plan.          Pursuant to the emergency declaration under the [employer] and the [address] authority and the Coronavirus Preparedness and [employer] Act, this Virtual  Visit was conducted, with patient's consent, to reduce the patient's risk of exposure to COVID-19 and provide continuity of care for an established patient. Services were provided through a video synchronous discussion virtually to substitute for in-person clinic visit.     Gerhardt Reap, MD    Adult Rheumatology   [facility]  A Part of [address]   Phone [phone]  Fax [phone]

## 2020-06-05 ENCOUNTER — TELEPHONE (OUTPATIENT)
Dept: FAMILY MEDICINE CLINIC | Age: 40
End: 2020-06-05

## 2020-06-05 DIAGNOSIS — Z22.7 LATENT TUBERCULOSIS: Primary | ICD-10-CM

## 2020-06-05 NOTE — TELEPHONE ENCOUNTER
----- Message from Rupinder Wagner sent at 6/5/2020  1:02 PM EDT -----  Regarding: Dr. Delores Anthony Message/Vendor Calls    Caller's first and last name:Lashawn Ureña      Reason for call: new pt appt      Callback required yes/no and why:yes      Best contact number(s):[phone]      Details to clarify the request:Pt requested a call to schedule a new pt appt,  and has a referral from Dr. Anish Block

## 2020-06-05 NOTE — TELEPHONE ENCOUNTER
Two pt identifiers confirmed. Informed pt per protocol provider has to review records prior to scheduling. Will return call once LPN receive approval date to schedule. Pt verbalized understanding of information discussed w/ no further questions at this time.

## 2020-06-12 NOTE — TELEPHONE ENCOUNTER
----- Message from Nirali Riley MD sent at 6/12/2020 11:23 AM EDT -----  7/2 at 10    Two pt identifiers confirmed. Informed pt per Dr. Jhoana Akers of billable VV appt depending on insurance plan. PCP: Henna Ruano MD    Last appt: Visit date not found  Future Appointments   Date Time Provider \[facility]\""   6/16/2020  9:20 AM Mini Gaffney MD [facility]   7/2/2020 10:00 AM Yuri Mathews MD [facility]     Pt verbalized understanding of information discussed w/ no further questions at this time.

## 2020-06-16 ENCOUNTER — DOCUMENTATION ONLY (OUTPATIENT)
Dept: RHEUMATOLOGY | Age: 40
End: 2020-06-16

## 2020-06-16 ENCOUNTER — VIRTUAL VISIT (OUTPATIENT)
Dept: RHEUMATOLOGY | Age: 40
End: 2020-06-16

## 2020-06-16 DIAGNOSIS — E05.00 GRAVES DISEASE: ICD-10-CM

## 2020-06-16 DIAGNOSIS — M45.9 ANKYLOSING SPONDYLITIS, UNSPECIFIED SITE OF SPINE (HCC): ICD-10-CM

## 2020-06-16 DIAGNOSIS — Z22.7 LATENT TUBERCULOSIS: ICD-10-CM

## 2020-06-16 DIAGNOSIS — Z79.60 LONG-TERM USE OF IMMUNOSUPPRESSANT MEDICATION: Primary | ICD-10-CM

## 2020-06-16 NOTE — PROGRESS NOTES
REASON FOR VISIT    Everardo Robledo is a 44 y.o. female who was seen by synchronous (real-time) audio-video technology on 6/16/2020. HISTORY OF DISEASE: Ankylosing spondylitis    Year of diagnosis: 2020  First visit with me: 1/2020  Cumulative disease manifestations: SI joint erosions  Positive serologies/labs:  Negative serologies/labs: Other co-morbidities: quantiferon gold positive  Relapses:      Past treatment history:  Prednisone:   Non-biologic DMARD:   Biologic:  Other: ON Rifampin since 4/2020 for latent Tb       HISTORY OF PRESENT ILLNESS     Previous medical records reviewed and summarized: yes    The patient notes she is doing okay. She didn't stop the rifampin. She has an appointment on July 12th. She has good and okay days with the rifampin. She gets her third month supply today for the rifampin. She is going to hold off on Humira until these issues are figured out. Her back pain is okay. She has good and bad days. She has pain especially with trying to get up from sitting position.       REVIEW OF SYSTEMS    Positives as per history  Negatives as follows:  Aziza Niños:    Denies unexplained persistent fevers, weight change, chronic fatigue  HEAD/EYES:              Denies eye redness, blurry vision or sudden loss of vision, dry eyes, HA, temporal artery pain  ENT:                            Denies oral/nasal ulcers, recurrent sinus infections, dry mouth  RESPIRATORY:         No pleuritic pain, history of pleural effusions, hemoptysis, exertional dyspnea  CARDIOVASCULAR:             Denies chest pain, history of pericardial effusions  GASTRO:                    Denies heartburn, esophageal dysmotility, abdominal pain, nausea, vomiting, diarrhea, blood in the stool  HEMATOLOGIC:        No easy bruising, purpura, swollen lymph nodes  SKIN:                           Denies alopecia, ulcers, nodules, sun sensitivity, unexplained persistent rash   VASCULAR:                Denies edema, cyanosis, raynaud phenomenon  NEUROLOGIC:           Denies specific muscle weakness, paresthesias   PSYCHIATRIC:           No sleep disturbance / snoring, depression, anxiety  MSK:                           No morning stiffness >1 hour, SI joint pain, persistent joint swelling, persistent joint pain    PAST MEDICAL HISTORY    Past Medical History:   Diagnosis Date    Hashimoto encephalopathy     Infertility, female     PCOS (polycystic ovarian syndrome)     Seasonal allergic rhinitis         Past Surgical History:   Procedure Laterality Date    HX APPENDECTOMY      HX  SECTION         FAMILY HISTORY    Family History   Problem Relation Age of Onset    Cancer Mother         breast    Heart Attack Father     High Cholesterol Father     Thyroid Disease Brother     Glaucoma Maternal Grandmother     Thyroid Disease Paternal Grandmother        SOCIAL HISTORY    Social History     Tobacco Use    Smoking status: Never Smoker    Smokeless tobacco: Never Used   Substance Use Topics    Alcohol use: Not Currently     Alcohol/week: 0.0 standard drinks    Drug use: No       MEDICATIONS    Current Outpatient Medications   Medication Sig Dispense Refill    celecoxib (CELEBREX) 50 mg capsule Take 1 Cap by mouth two (2) times a day for 90 days. 60 Cap 2    rifAMPin (RIFADIN) 300 mg capsule Take 2 Caps by mouth daily. 60 Cap 4    cholecalciferol, vitamin D3, (VITAMIN D3) 50 mcg (2,000 unit) tab Take  by mouth.  diclofenac (VOLTAREN) 1 % gel Apply 2 g to affected area four (4) times daily. 1 Each 5    methIMAzole (TAPAZOLE) 10 mg tablet Take 25 mg by mouth daily. 5    propranolol (INDERAL) 20 mg tablet TK 1 T PO BID  5    fexofenadine (ALLEGRA) 180 mg tablet Take  by mouth.  PV W-O JULIANE/FERROUS FUMARATE/FA (M-VIT PO) Take  by mouth.  adalimumab (HUMIRA,CF, PEN) 40 mg/0.4 mL injection pen 0.4 mL by SubCUTAneous route every fourteen (14) days.  2 Kit 3       ALLERGIES    Allergies Allergen Reactions    Codeine Other (comments)     Hallucination     Percocet [Oxycodone-Acetaminophen] Itching and Nausea and Vomiting    Hydrocodone Itching and Nausea and Vomiting    Ibuprofen Other (comments)     reflux    Pcn [Penicillins] Unknown (comments)       PHYSICAL EXAMINATION    General: NAD, looks well, normal respiratory effort  HEENT: PERRL, anicteric, non-injected sclerae; lids without inflammation  Pulmonary: Normal respirations, no retractions, coughing  Skin: grossly normal via video call  Neuro: Alert; able to carry normal conversation, cognition, affect normal    Musculoskeletal:   Grossly normal hands    Due to this being a TeleHealth evaluation, many elements of the physical examination are unable to be assessed. DATA REVIEW    Prior medical records were reviewed and if applicable are summarized as below:    Labs:   2/2020: quantiferon gold positive  1/2020: SSA/B JO1, CCP, HLA B27, hepatitis panel negative  1/2020: WBC 4.1, Plt 411, Hb 13.4, Alk phos 228, ALT 32  9/2019: CRP 6.1, Alk phos 126, ALT 31, TSH low, ESR normal, MELISSA negative, WBC 2.7, RF positive    Imaging:   SI joint x-rays (1/2020): There is some mild widening along the synovial portion of the SI joints. On the oblique projections there is a suggestion of erosive disease involving the ileal margin of the left SI joint. There is questionable mild erosive disease of the ileal margin in the right SI joint. The sacral margins of the SI joint are normal.  This examination is negative for bridging osteophyte formation or significant reactive subchondral sclerosis. This examination is negative for chondrocalcinosis.     Hand x-rays: Normal     Foot x-rays: Small bone spur involving the posterior os calcis. ASSESSMENT AND PLAN    A 44 y.o. female with hx of graves disease, ankylosing spondylitis, latent tuberculosis on rifampin daily presents for a virtual visit.  The patient is having difficulty tolerating rifampin and is going to see an ID doctor. She would like to hold off on Humira right now since she has side effects from rifampin anyway. # Latent tuberculosis:  - on rifampin 300 mg oral daily. - needs 2 more months of therapy  - to see ID as difficulty tolerating medication    # Ankylosing spondylitis:  - start Humira in the future  - will have patient come in for teaching  - order submitted to [facility] today    # Transaminitis:  - patient got blood work. Will try to obtain results    # graves disease:  - on methimazole    # Medication Toxicity Monitoring:  - cbc, cmp: will obtain results  - Hepatitis B, C:  Negative 1/2020  - Immunizations: We discussed receiving influenza, Prevar-13, and Pneumovax-23 vaccines as per the CDC guidelines for immunosuppressed patients. - bone health: Discuss at next visit    RTC in 5 weeks via virtual visit    The patient voiced understanding of the aforementioned assessment and plan. Summary of plan was provided in the After Visit Summary patient instructions. I also provided education about MyChart setup and utility. Ms. Chuyita Painter has a reminder for a \"due or due soon\" health maintenance. I have asked that she contact her primary care provider for follow-up on this health maintenance. TODAY'S ORDERS    No orders of the defined types were placed in this encounter. Future Appointments   Date Time Provider Gunjan Rivera   7/2/2020 10:00 AM Jaxson Carter MD [facility]     We discussed the expected course, resolution and complications of the diagnosis(es) in detail. Medication risks, benefits, costs, interactions, and alternatives were discussed as indicated. I advised her to contact the office if her condition worsens, changes or fails to improve as anticipated. She expressed understanding with the diagnosis(es) and plan.      Pursuant to the emergency declaration under the [facility] waiver authority and the Coronavirus Preparedness and Response Supplemental Appropriations Act, this Virtual  Visit was conducted, with patient's consent, to reduce the patient's risk of exposure to COVID-19 and provide continuity of care for an established patient. Services were provided through a video synchronous discussion virtually to substitute for in-person clinic visit.     Sia Moore MD    Adult Rheumatology   [facility]  A Part of [facility],  [address]   Phone [phone]  Fax [phone]

## 2020-06-16 NOTE — PATIENT INSTRUCTIONS
Please fill out your [address] you will receive after your visit in the mail or via [address]

## 2020-06-24 DIAGNOSIS — E05.00 GRAVES DISEASE: ICD-10-CM

## 2020-06-24 DIAGNOSIS — Z79.60 LONG-TERM USE OF IMMUNOSUPPRESSANT MEDICATION: ICD-10-CM

## 2020-07-02 ENCOUNTER — PATIENT MESSAGE (OUTPATIENT)
Dept: RHEUMATOLOGY | Age: 40
End: 2020-07-02

## 2020-07-21 RX ORDER — CELECOXIB 50 MG/1
50 CAPSULE ORAL 2 TIMES DAILY
Qty: 60 CAP | Refills: 3 | Status: SHIPPED | OUTPATIENT
Start: 2020-07-21 | End: 2020-10-19

## 2020-07-30 ENCOUNTER — TELEPHONE (OUTPATIENT)
Dept: RHEUMATOLOGY | Age: 40
End: 2020-07-30

## 2020-07-30 NOTE — TELEPHONE ENCOUNTER
----- Message from Blanquita Kebede, RN sent at 7/29/2020  3:00 PM EDT -----  Regarding: FW: Dr Jus Ventura    ----- Message -----  From: Graciela Leyva  Sent: 7/29/2020   2:26 PM EDT  To: [facility] Nurse Pool  Subject: Dr Jus Ventura                             Pt is calling to make a nurse appt for her Humira Shot, please call pt at [phone]

## 2020-07-31 ENCOUNTER — DOCUMENTATION ONLY (OUTPATIENT)
Dept: PHARMACY | Age: 40
End: 2020-07-31

## 2020-07-31 NOTE — PROGRESS NOTES
[facility] at [address] [facility] Update    Date: 07/31/20    Dea Ward 1980    Medication: Humira Pen 40 mg/0.4 ml    Prescription transferred to specialty pharmacy: [facility]    Phone: [phone]    Pharmacist counseled the patient and informed patient their prescription was transferred to the mandated specialty pharmacy and gave patient the specialty pharmacy's phone number. Pharmacist answered any questions that the patient had.     Jada Barron, [address] at [facility],  Bianca Stark, [address]  phone: [phone]   fax: [phone]

## 2020-12-03 ENCOUNTER — TELEPHONE (OUTPATIENT)
Dept: RHEUMATOLOGY | Age: 40
End: 2020-12-03

## 2020-12-03 DIAGNOSIS — M45.9 ANKYLOSING SPONDYLITIS, UNSPECIFIED SITE OF SPINE (HCC): Primary | ICD-10-CM

## 2020-12-03 RX ORDER — ADALIMUMAB 40MG/0.4ML
40 KIT SUBCUTANEOUS
Qty: 2 KIT | Refills: 5 | Status: SHIPPED | OUTPATIENT
Start: 2020-12-03

## 2020-12-22 ENCOUNTER — TRANSCRIBE ORDER (OUTPATIENT)
Dept: REGISTRATION | Age: 40
End: 2020-12-22

## 2020-12-22 DIAGNOSIS — Z01.812 PRE-PROCEDURE LAB EXAM: Primary | ICD-10-CM

## 2020-12-23 ENCOUNTER — HOSPITAL ENCOUNTER (OUTPATIENT)
Dept: PREADMISSION TESTING | Age: 40
Discharge: HOME | End: 2020-12-23
Payer: OTHER GOVERNMENT

## 2020-12-23 VITALS
BODY MASS INDEX: 44.89 KG/M2 | HEART RATE: 79 BPM | RESPIRATION RATE: 16 BRPM | TEMPERATURE: 98 F | HEIGHT: 66 IN | WEIGHT: 279.32 LBS | DIASTOLIC BLOOD PRESSURE: 81 MMHG | SYSTOLIC BLOOD PRESSURE: 137 MMHG

## 2020-12-23 PROCEDURE — 93005 ELECTROCARDIOGRAM TRACING: CPT

## 2020-12-23 RX ORDER — CELECOXIB 100 MG/1
100 CAPSULE ORAL
COMMUNITY

## 2020-12-23 NOTE — PERIOP NOTES
Patient given surgical site infection FAQs handout and hand hygiene tips sheet. Pre-operative instructions reviewed and patient verbalizes understanding of instructions. Patient has been given the opportunity to ask additional questions. Pt given 2 bottles of CHG soap and instructed in use. Pt instructed that they will be scheduled for COVID 19 test 4 days prior to surgery. COVID instruction sheet and instructions given to PT. Pt instructed they must self quarantine between  COVID 19 test and day of surgery. Parking deck instructions  given to patient. Instructions reviewed with patient. GABBIBE TO DRAW BLOOD IN PAT. PT TO HAVE ENDOCRINOLOGIST DRAW BLOOD THIS AFTERNOON  DR Lorna Moses [phone]    ENDOCRINOLOGY NOTES RECEIVED AND PLACED ON CHART.     FAX SENT TO [facility] FOR RHEUMATOLOGY NOTES FROM DR Gerber Merchant

## 2020-12-24 LAB
ATRIAL RATE: 66 BPM
CALCULATED P AXIS, ECG09: 49 DEGREES
CALCULATED R AXIS, ECG10: 63 DEGREES
CALCULATED T AXIS, ECG11: 39 DEGREES
DIAGNOSIS, 93000: NORMAL
P-R INTERVAL, ECG05: 126 MS
Q-T INTERVAL, ECG07: 388 MS
QRS DURATION, ECG06: 82 MS
QTC CALCULATION (BEZET), ECG08: 406 MS
VENTRICULAR RATE, ECG03: 66 BPM

## 2021-01-03 ENCOUNTER — HOSPITAL ENCOUNTER (OUTPATIENT)
Dept: PREADMISSION TESTING | Age: 41
Discharge: HOME | End: 2021-01-03
Payer: OTHER GOVERNMENT

## 2021-01-03 DIAGNOSIS — Z01.812 PRE-PROCEDURE LAB EXAM: ICD-10-CM

## 2021-01-03 PROCEDURE — 87635 SARS-COV-2 COVID-19 AMP PRB: CPT

## 2021-01-04 LAB — SARS-COV-2, COV2NT: NOT DETECTED

## 2021-01-11 ENCOUNTER — TRANSCRIBE ORDER (OUTPATIENT)
Dept: REGISTRATION | Age: 41
End: 2021-01-11

## 2021-01-11 DIAGNOSIS — Z01.812 PRE-PROCEDURE LAB EXAM: Primary | ICD-10-CM

## 2021-01-17 ENCOUNTER — HOSPITAL ENCOUNTER (OUTPATIENT)
Dept: PREADMISSION TESTING | Age: 41
Discharge: HOME | End: 2021-01-17
Payer: OTHER GOVERNMENT

## 2021-01-17 DIAGNOSIS — Z01.812 PRE-PROCEDURE LAB EXAM: ICD-10-CM

## 2021-01-17 PROCEDURE — U0003 INFECTIOUS AGENT DETECTION BY NUCLEIC ACID (DNA OR RNA); SEVERE ACUTE RESPIRATORY SYNDROME CORONAVIRUS 2 (SARS-COV-2) (CORONAVIRUS DISEASE [COVID-19]), AMPLIFIED PROBE TECHNIQUE, MAKING USE OF HIGH THROUGHPUT TECHNOLOGIES AS DESCRIBED BY CMS-2020-01-R: HCPCS

## 2021-01-18 LAB — SARS-COV-2, COV2NT: NOT DETECTED

## 2021-01-21 ENCOUNTER — HOSPITAL ENCOUNTER (OUTPATIENT)
Age: 41
Setting detail: OBSERVATION
Discharge: HOME | End: 2021-01-22
Attending: OTOLARYNGOLOGY | Admitting: OTOLARYNGOLOGY
Payer: OTHER GOVERNMENT

## 2021-01-21 ENCOUNTER — ANESTHESIA EVENT (OUTPATIENT)
Dept: SURGERY | Age: 41
End: 2021-01-21
Payer: OTHER GOVERNMENT

## 2021-01-21 ENCOUNTER — ANESTHESIA (OUTPATIENT)
Dept: SURGERY | Age: 41
End: 2021-01-21
Payer: OTHER GOVERNMENT

## 2021-01-21 DIAGNOSIS — E05.00 GRAVES DISEASE: Primary | ICD-10-CM

## 2021-01-21 PROBLEM — E06.3 AUTOIMMUNE THYROIDITIS: Status: ACTIVE | Noted: 2021-01-21

## 2021-01-21 LAB — HCG UR QL: NEGATIVE

## 2021-01-21 PROCEDURE — 76060000036 HC ANESTHESIA 2.5 TO 3 HR: Performed by: OTOLARYNGOLOGY

## 2021-01-21 PROCEDURE — 77030002996 HC SUT SLK J&J -A: Performed by: OTOLARYNGOLOGY

## 2021-01-21 PROCEDURE — 74011250636 HC RX REV CODE- 250/636: Performed by: ANESTHESIOLOGY

## 2021-01-21 PROCEDURE — 77030040356 HC CORD BPLR FRCP COVD -A: Performed by: OTOLARYNGOLOGY

## 2021-01-21 PROCEDURE — 74011250636 HC RX REV CODE- 250/636: Performed by: OTOLARYNGOLOGY

## 2021-01-21 PROCEDURE — 74011000250 HC RX REV CODE- 250: Performed by: OTOLARYNGOLOGY

## 2021-01-21 PROCEDURE — 77030040361 HC SLV COMPR DVT MDII -B: Performed by: OTOLARYNGOLOGY

## 2021-01-21 PROCEDURE — 77030008698 HC TU ET REINF MEDT -D: Performed by: ANESTHESIOLOGY

## 2021-01-21 PROCEDURE — 99218 HC RM OBSERVATION: CPT

## 2021-01-21 PROCEDURE — 74011000258 HC RX REV CODE- 258: Performed by: NURSE ANESTHETIST, CERTIFIED REGISTERED

## 2021-01-21 PROCEDURE — 76010000132 HC OR TIME 2.5 TO 3 HR: Performed by: OTOLARYNGOLOGY

## 2021-01-21 PROCEDURE — 74011000250 HC RX REV CODE- 250: Performed by: NURSE ANESTHETIST, CERTIFIED REGISTERED

## 2021-01-21 PROCEDURE — 76210000016 HC OR PH I REC 1 TO 1.5 HR: Performed by: OTOLARYNGOLOGY

## 2021-01-21 PROCEDURE — 77030031139 HC SUT VCRL2 J&J -A: Performed by: OTOLARYNGOLOGY

## 2021-01-21 PROCEDURE — 74011250637 HC RX REV CODE- 250/637: Performed by: OTOLARYNGOLOGY

## 2021-01-21 PROCEDURE — 77030040922 HC BLNKT HYPOTHRM STRY -A

## 2021-01-21 PROCEDURE — 77030040830 HC CATH URETH FOL MDII -A: Performed by: OTOLARYNGOLOGY

## 2021-01-21 PROCEDURE — 88307 TISSUE EXAM BY PATHOLOGIST: CPT

## 2021-01-21 PROCEDURE — 2709999900 HC NON-CHARGEABLE SUPPLY: Performed by: OTOLARYNGOLOGY

## 2021-01-21 PROCEDURE — 77030011267 HC ELECTRD BLD COVD -A: Performed by: OTOLARYNGOLOGY

## 2021-01-21 PROCEDURE — 77030038552 HC DRN WND MDII -A: Performed by: OTOLARYNGOLOGY

## 2021-01-21 PROCEDURE — 74011250636 HC RX REV CODE- 250/636: Performed by: NURSE ANESTHETIST, CERTIFIED REGISTERED

## 2021-01-21 PROCEDURE — 81025 URINE PREGNANCY TEST: CPT

## 2021-01-21 RX ORDER — MIDAZOLAM HYDROCHLORIDE 1 MG/ML
INJECTION, SOLUTION INTRAMUSCULAR; INTRAVENOUS AS NEEDED
Status: DISCONTINUED | OUTPATIENT
Start: 2021-01-21 | End: 2021-01-21 | Stop reason: HOSPADM

## 2021-01-21 RX ORDER — FENTANYL CITRATE 50 UG/ML
25 INJECTION, SOLUTION INTRAMUSCULAR; INTRAVENOUS
Status: DISCONTINUED | OUTPATIENT
Start: 2021-01-21 | End: 2021-01-21 | Stop reason: HOSPADM

## 2021-01-21 RX ORDER — SODIUM CHLORIDE 0.9 % (FLUSH) 0.9 %
5-40 SYRINGE (ML) INJECTION EVERY 8 HOURS
Status: DISCONTINUED | OUTPATIENT
Start: 2021-01-21 | End: 2021-01-21 | Stop reason: HOSPADM

## 2021-01-21 RX ORDER — TRAMADOL HYDROCHLORIDE 50 MG/1
50 TABLET ORAL
Status: DISCONTINUED | OUTPATIENT
Start: 2021-01-21 | End: 2021-01-22 | Stop reason: HOSPADM

## 2021-01-21 RX ORDER — MIDAZOLAM HYDROCHLORIDE 1 MG/ML
1 INJECTION, SOLUTION INTRAMUSCULAR; INTRAVENOUS AS NEEDED
Status: DISCONTINUED | OUTPATIENT
Start: 2021-01-21 | End: 2021-01-21 | Stop reason: HOSPADM

## 2021-01-21 RX ORDER — ESOMEPRAZOLE MAGNESIUM 40 MG/1
40 CAPSULE, DELAYED RELEASE ORAL DAILY
COMMUNITY

## 2021-01-21 RX ORDER — ONDANSETRON 2 MG/ML
INJECTION INTRAMUSCULAR; INTRAVENOUS AS NEEDED
Status: DISCONTINUED | OUTPATIENT
Start: 2021-01-21 | End: 2021-01-21 | Stop reason: HOSPADM

## 2021-01-21 RX ORDER — ONDANSETRON 2 MG/ML
4 INJECTION INTRAMUSCULAR; INTRAVENOUS
Status: DISCONTINUED | OUTPATIENT
Start: 2021-01-21 | End: 2021-01-22 | Stop reason: HOSPADM

## 2021-01-21 RX ORDER — SODIUM CHLORIDE 0.9 % (FLUSH) 0.9 %
5-40 SYRINGE (ML) INJECTION AS NEEDED
Status: DISCONTINUED | OUTPATIENT
Start: 2021-01-21 | End: 2021-01-21 | Stop reason: HOSPADM

## 2021-01-21 RX ORDER — LIDOCAINE HYDROCHLORIDE 10 MG/ML
0.1 INJECTION, SOLUTION EPIDURAL; INFILTRATION; INTRACAUDAL; PERINEURAL AS NEEDED
Status: DISCONTINUED | OUTPATIENT
Start: 2021-01-21 | End: 2021-01-21 | Stop reason: HOSPADM

## 2021-01-21 RX ORDER — SUCCINYLCHOLINE CHLORIDE 20 MG/ML
INJECTION INTRAMUSCULAR; INTRAVENOUS AS NEEDED
Status: DISCONTINUED | OUTPATIENT
Start: 2021-01-21 | End: 2021-01-21 | Stop reason: HOSPADM

## 2021-01-21 RX ORDER — ONDANSETRON 2 MG/ML
4 INJECTION INTRAMUSCULAR; INTRAVENOUS AS NEEDED
Status: DISCONTINUED | OUTPATIENT
Start: 2021-01-21 | End: 2021-01-21 | Stop reason: HOSPADM

## 2021-01-21 RX ORDER — PHENYLEPHRINE HCL IN 0.9% NACL 0.4MG/10ML
SYRINGE (ML) INTRAVENOUS AS NEEDED
Status: DISCONTINUED | OUTPATIENT
Start: 2021-01-21 | End: 2021-01-21 | Stop reason: HOSPADM

## 2021-01-21 RX ORDER — CLINDAMYCIN PHOSPHATE 900 MG/50ML
900 INJECTION INTRAVENOUS EVERY 6 HOURS
Status: COMPLETED | OUTPATIENT
Start: 2021-01-22 | End: 2021-01-22

## 2021-01-21 RX ORDER — FENTANYL CITRATE 50 UG/ML
INJECTION, SOLUTION INTRAMUSCULAR; INTRAVENOUS AS NEEDED
Status: DISCONTINUED | OUTPATIENT
Start: 2021-01-21 | End: 2021-01-21 | Stop reason: HOSPADM

## 2021-01-21 RX ORDER — LIDOCAINE HYDROCHLORIDE AND EPINEPHRINE 10; 10 MG/ML; UG/ML
30 INJECTION, SOLUTION INFILTRATION; PERINEURAL ONCE
Status: COMPLETED | OUTPATIENT
Start: 2021-01-21 | End: 2021-01-21

## 2021-01-21 RX ORDER — SODIUM CHLORIDE, SODIUM LACTATE, POTASSIUM CHLORIDE, CALCIUM CHLORIDE 600; 310; 30; 20 MG/100ML; MG/100ML; MG/100ML; MG/100ML
50 INJECTION, SOLUTION INTRAVENOUS CONTINUOUS
Status: DISPENSED | OUTPATIENT
Start: 2021-01-21 | End: 2021-01-22

## 2021-01-21 RX ORDER — DEXMEDETOMIDINE HYDROCHLORIDE 100 UG/ML
INJECTION, SOLUTION INTRAVENOUS AS NEEDED
Status: DISCONTINUED | OUTPATIENT
Start: 2021-01-21 | End: 2021-01-21 | Stop reason: HOSPADM

## 2021-01-21 RX ORDER — ROCURONIUM BROMIDE 10 MG/ML
INJECTION, SOLUTION INTRAVENOUS AS NEEDED
Status: DISCONTINUED | OUTPATIENT
Start: 2021-01-21 | End: 2021-01-21 | Stop reason: HOSPADM

## 2021-01-21 RX ORDER — MORPHINE SULFATE 2 MG/ML
2 INJECTION, SOLUTION INTRAMUSCULAR; INTRAVENOUS
Status: DISCONTINUED | OUTPATIENT
Start: 2021-01-21 | End: 2021-01-22 | Stop reason: HOSPADM

## 2021-01-21 RX ORDER — LIDOCAINE HYDROCHLORIDE 20 MG/ML
INJECTION, SOLUTION EPIDURAL; INFILTRATION; INTRACAUDAL; PERINEURAL AS NEEDED
Status: DISCONTINUED | OUTPATIENT
Start: 2021-01-21 | End: 2021-01-21 | Stop reason: HOSPADM

## 2021-01-21 RX ORDER — SODIUM CHLORIDE 0.9 % (FLUSH) 0.9 %
5-40 SYRINGE (ML) INJECTION EVERY 8 HOURS
Status: DISCONTINUED | OUTPATIENT
Start: 2021-01-21 | End: 2021-01-22 | Stop reason: HOSPADM

## 2021-01-21 RX ORDER — PROPOFOL 10 MG/ML
INJECTION, EMULSION INTRAVENOUS AS NEEDED
Status: DISCONTINUED | OUTPATIENT
Start: 2021-01-21 | End: 2021-01-21 | Stop reason: HOSPADM

## 2021-01-21 RX ORDER — DEXAMETHASONE SODIUM PHOSPHATE 4 MG/ML
INJECTION, SOLUTION INTRA-ARTICULAR; INTRALESIONAL; INTRAMUSCULAR; INTRAVENOUS; SOFT TISSUE AS NEEDED
Status: DISCONTINUED | OUTPATIENT
Start: 2021-01-21 | End: 2021-01-21 | Stop reason: HOSPADM

## 2021-01-21 RX ADMIN — MIDAZOLAM 2 MG: 1 INJECTION INTRAMUSCULAR; INTRAVENOUS at 15:25

## 2021-01-21 RX ADMIN — PROPOFOL 50 MG: 10 INJECTION, EMULSION INTRAVENOUS at 16:04

## 2021-01-21 RX ADMIN — ONDANSETRON HYDROCHLORIDE 4 MG: 2 INJECTION, SOLUTION INTRAMUSCULAR; INTRAVENOUS at 17:26

## 2021-01-21 RX ADMIN — DEXMEDETOMIDINE HYDROCHLORIDE 4 MCG: 100 INJECTION, SOLUTION, CONCENTRATE INTRAVENOUS at 15:46

## 2021-01-21 RX ADMIN — LIDOCAINE HYDROCHLORIDE 60 MG: 20 INJECTION, SOLUTION EPIDURAL; INFILTRATION; INTRACAUDAL; PERINEURAL at 15:39

## 2021-01-21 RX ADMIN — PROPOFOL 200 MG: 10 INJECTION, EMULSION INTRAVENOUS at 15:39

## 2021-01-21 RX ADMIN — FENTANYL CITRATE 50 MCG: 50 INJECTION, SOLUTION INTRAMUSCULAR; INTRAVENOUS at 15:39

## 2021-01-21 RX ADMIN — SODIUM CHLORIDE, POTASSIUM CHLORIDE, SODIUM LACTATE AND CALCIUM CHLORIDE: 600; 310; 30; 20 INJECTION, SOLUTION INTRAVENOUS at 15:25

## 2021-01-21 RX ADMIN — SODIUM CHLORIDE, POTASSIUM CHLORIDE, SODIUM LACTATE AND CALCIUM CHLORIDE 50 ML/HR: 600; 310; 30; 20 INJECTION, SOLUTION INTRAVENOUS at 13:49

## 2021-01-21 RX ADMIN — DEXAMETHASONE SODIUM PHOSPHATE 8 MG: 4 INJECTION, SOLUTION INTRAMUSCULAR; INTRAVENOUS at 15:49

## 2021-01-21 RX ADMIN — SODIUM CHLORIDE 900 MG: 9 INJECTION, SOLUTION INTRAVENOUS at 16:01

## 2021-01-21 RX ADMIN — ROCURONIUM BROMIDE 5 MG: 10 SOLUTION INTRAVENOUS at 15:39

## 2021-01-21 RX ADMIN — TRAMADOL HYDROCHLORIDE 50 MG: 50 TABLET, FILM COATED ORAL at 21:09

## 2021-01-21 RX ADMIN — CLINDAMYCIN IN 5 PERCENT DEXTROSE 900 MG: 18 INJECTION, SOLUTION INTRAVENOUS at 23:45

## 2021-01-21 RX ADMIN — PROPOFOL 50 MG: 10 INJECTION, EMULSION INTRAVENOUS at 15:44

## 2021-01-21 RX ADMIN — PROPOFOL 50 MG: 10 INJECTION, EMULSION INTRAVENOUS at 15:50

## 2021-01-21 RX ADMIN — ONDANSETRON 4 MG: 2 INJECTION INTRAMUSCULAR; INTRAVENOUS at 18:37

## 2021-01-21 RX ADMIN — DEXMEDETOMIDINE HYDROCHLORIDE 4 MCG: 100 INJECTION, SOLUTION, CONCENTRATE INTRAVENOUS at 15:59

## 2021-01-21 RX ADMIN — Medication 10 ML: at 21:12

## 2021-01-21 RX ADMIN — FENTANYL CITRATE 25 MCG: 50 INJECTION, SOLUTION INTRAMUSCULAR; INTRAVENOUS at 18:45

## 2021-01-21 RX ADMIN — SODIUM CHLORIDE, POTASSIUM CHLORIDE, SODIUM LACTATE AND CALCIUM CHLORIDE: 600; 310; 30; 20 INJECTION, SOLUTION INTRAVENOUS at 17:58

## 2021-01-21 RX ADMIN — SODIUM CHLORIDE, POTASSIUM CHLORIDE, SODIUM LACTATE AND CALCIUM CHLORIDE 50 ML/HR: 600; 310; 30; 20 INJECTION, SOLUTION INTRAVENOUS at 18:10

## 2021-01-21 RX ADMIN — Medication 80 MCG: at 16:52

## 2021-01-21 RX ADMIN — FENTANYL CITRATE 50 MCG: 50 INJECTION, SOLUTION INTRAMUSCULAR; INTRAVENOUS at 15:53

## 2021-01-21 RX ADMIN — SUCCINYLCHOLINE CHLORIDE 140 MG: 20 INJECTION, SOLUTION INTRAMUSCULAR; INTRAVENOUS at 15:39

## 2021-01-21 RX ADMIN — MORPHINE SULFATE 2 MG: 2 INJECTION, SOLUTION INTRAMUSCULAR; INTRAVENOUS at 23:44

## 2021-01-21 NOTE — ANESTHESIA POSTPROCEDURE EVALUATION
Procedure(s):  TOTAL THYROIDECTOMY WITH NERVE MONITORING. general    Anesthesia Post Evaluation        Patient participation: complete - patient participated  Level of consciousness: awake  Pain management: adequate  Airway patency: patent  Anesthetic complications: no  Cardiovascular status: hemodynamically stable  Respiratory status: acceptable  Hydration status: acceptable  Comments: The patient is ready for PACU discharge. [address], DO                   Post anesthesia nausea and vomiting:  controlled      INITIAL Post-op Vital signs:   Vitals Value Taken Time   /71 01/21/21 1830   Temp 36.4 °C (97.6 °F) 01/21/21 1808   Pulse 79 01/21/21 1840   Resp 29 01/21/21 1840   SpO2 98 % 01/21/21 1840   Vitals shown include unvalidated device data.

## 2021-01-21 NOTE — ANESTHESIA PREPROCEDURE EVALUATION
Relevant Problems   No relevant active problems       Anesthetic History   No history of anesthetic complications  PONV          Review of Systems / Medical History  Patient summary reviewed, nursing notes reviewed and pertinent labs reviewed    Pulmonary  Within defined limits                 Neuro/Psych   Within defined limits           Cardiovascular  Within defined limits          Dysrhythmias       Exercise tolerance: >4 METS     GI/Hepatic/Renal  Within defined limits   GERD      Liver disease     Endo/Other  Within defined limits    Hypothyroidism  Morbid obesity and arthritis     Other Findings   Comments: pcos  AS           Physical Exam    Airway  Mallampati: III  TM Distance: 4 - 6 cm  Neck ROM: normal range of motion, short neck   Mouth opening: Normal     Cardiovascular  Regular rate and rhythm,  S1 and S2 normal,  no murmur, click, rub, or gallop             Dental  No notable dental hx       Pulmonary  Breath sounds clear to auscultation               Abdominal  GI exam deferred       Other Findings            Anesthetic Plan    ASA: 2  Anesthesia type: general          Induction: Intravenous  Anesthetic plan and risks discussed with: Patient

## 2021-01-21 NOTE — PERIOP NOTES
Patient mother Odette Marrufo called and updated on surgery progress via cell phone number [phone]. Currently waiting at home.

## 2021-01-21 NOTE — PERIOP NOTES
TRANSFER - OUT REPORT:    Verbal report given to ANY Contreras(name) on Jasmin Snider  being transferred to 503(unit) for routine post - op       Report consisted of patients Situation, Background, Assessment and   Recommendations(SBAR). Time Pre op antibiotic given:  16:01 Clindamycin  Anesthesia Stop time: 18:08  Davila Present on Transfer to floor:no  Order for Davila on Chart:no  Discharge Prescriptions with Chart:no    Information from the following report(s) SBAR, Kardex, OR Summary, Procedure Summary, Intake/Output, MAR, Recent Results, Med Rec Status and Cardiac Rhythm SR was reviewed with the receiving nurse. Opportunity for questions and clarification was provided. Is the patient on 02? NO       L/Min        Other   Is the patient on a monitor? NO    Is the nurse transporting with the patient? NO    Surgical Waiting Area notified of patient's transfer from PACU? YES      The following personal items collected during your admission accompanied patient upon transfer:   Dental Appliance: Dental Appliances: None  Vision: Visual Aid: Glasses  Hearing Aid:    Jewelry: Jewelry: None  Clothing: Clothing: (glasses and bag of clothes returned to pt.)  Other Valuables: Other Valuables:  Other (comment), Eyeglasses(PERSONAL MASK TO OR; GLASSES TO PACU)  Valuables sent to safe: Personal Items Sent to Safe: BELONGINGS TO SECURITY - PLEASE SEE SECURITY RECORD FORM

## 2021-01-21 NOTE — H&P
History and Physical    Subjective: Whit Gerber is a 36 y.o.  female who presents with Graves disease, favoring surgical treatment. .         Past Medical History:   Diagnosis Date    Adverse effect of anesthesia     BLOOD PRESSURE DROPS    Ankylosing spondylitis lumbar region ([address]     Arrhythmia     H/O TACHYCARDIA    Arthritis     Chronic pain     BACK    GERD (gastroesophageal reflux disease)     Graves' disease     Hashimoto encephalopathy     Infertility, female     Liver disease     ELEVATED LIVER ENZYMES DUE TO MEDS PER PT    Morbid obesity ([address]     Motion sickness     PCOS (polycystic ovarian syndrome)     Seasonal allergic rhinitis       Past Surgical History:   Procedure Laterality Date    HX APPENDECTOMY      HX  SECTION      X1    HX GYN      MYOMECTOMY     Family History   Problem Relation Age of Onset    Cancer Mother         breast    Heart Attack Father     High Cholesterol Father     Hypertension Father     Thyroid Disease Brother     Glaucoma Maternal Grandmother     Thyroid Disease Paternal Grandmother     No Known Problems Sister     Thyroid Disease Brother     Anesth Problems Neg Hx       Social History     Tobacco Use    Smoking status: Never Smoker    Smokeless tobacco: Never Used   Substance Use Topics    Alcohol use: Yes     Alcohol/week: 0.0 standard drinks     Comment: RARE       Prior to Admission medications    Medication Sig Start Date End Date Taking? Authorizing Provider   esomeprazole (NEXIUM) 40 mg capsule Take 40 mg by mouth daily. Yes Provider, Historical   adalimumab (Humira,CF, Pen) 40 mg/0.4 mL injection pen 0.4 mL by SubCUTAneous route every fourteen (14) days. 12/3/20  Yes Denis Wong MD   cholecalciferol, vitamin D3, (VITAMIN D3) 50 mcg (2,000 unit) tab Take 5,000 Units by mouth daily. Yes Provider, Historical   diclofenac (VOLTAREN) 1 % gel Apply 2 g to affected area four (4) times daily.   Patient taking differently: Apply 2 g to affected area four (4) times daily as needed. 1/10/20  Yes Gem Pathak MD   methIMAzole (TAPAZOLE) 10 mg tablet Take 25 mg by mouth daily. 10/11/19  Yes Provider, Historical   PV W-O JULIANE/FERROUS FUMARATE/FA (M-VIT PO) Take 1 Tab by mouth daily. Yes Provider, Historical   celecoxib (CeleBREX) 100 mg capsule Take 100 mg by mouth every morning. Provider, Historical     Allergies   Allergen Reactions    Codeine Other (comments)     Hallucination     Percocet [Oxycodone-Acetaminophen] Itching and Nausea and Vomiting    Hydrocodone Itching and Nausea and Vomiting    Ibuprofen Other (comments)     reflux    Pcn [Penicillins] Hives and Itching        Review of Systems:  A comprehensive review of systems was negative except for that written in the History of Present Illness. Objective: Intake and Output:    No intake/output data recorded. No intake/output data recorded. Physical Exam:   Visit Vitals  /85 (BP 1 Location: Left arm, BP Patient Position: At rest)   Pulse 77   Temp 98.4 °F (36.9 °C)   Resp 16   Ht 5' 6\" (1.676 m)   Wt 126.7 kg (279 lb 5.2 oz)   SpO2 97%   BMI 45.08 kg/m²     General:  Alert, cooperative, no distress, appears stated age. Head:  Normocephalic, without obvious abnormality, atraumatic. Eyes:  Conjunctivae/corneas clear. PERRL, EOMs intact. Fundi benign. Ears:  Normal TMs and external ear canals both ears. Nose: Nares normal. Septum midline. Mucosa normal. No drainage or sinus tenderness. Throat: Lips, mucosa, and tongue normal. Teeth and gums normal.   Neck: Full thyroid gland. Back:   Symmetric, no curvature. ROM normal. No CVA tenderness. Lungs:   Clear to auscultation bilaterally. Chest wall:  No tenderness or deformity. Heart:  Regular rate and rhythm, S1, S2 normal, no murmur, click, rub or gallop. Extremities: Extremities normal, atraumatic, no cyanosis or edema.    Pulses: 2+ and symmetric all extremities. Skin: Skin color, texture, turgor normal. No rashes or lesions. Lymph nodes: Cervical, supraclavicular, and axillary nodes normal.   Neurologic: CNII-XII intact. Normal strength, sensation and reflexes throughout. Data Review:   Recent Results (from the past 24 hour(s))   HCG URINE, QL. - POC    Collection Time: 01/21/21  1:29 PM   Result Value Ref Range    Pregnancy test,urine (POC) Negative NEG           Assessment:     Graves thyoiditis    Plan:      Total thyroidectomy    Signed By: Hernandez Leiva MD     January 21, 2021

## 2021-01-22 VITALS
OXYGEN SATURATION: 96 % | HEART RATE: 80 BPM | RESPIRATION RATE: 16 BRPM | TEMPERATURE: 98.6 F | BODY MASS INDEX: 44.89 KG/M2 | WEIGHT: 279.32 LBS | SYSTOLIC BLOOD PRESSURE: 114 MMHG | DIASTOLIC BLOOD PRESSURE: 77 MMHG | HEIGHT: 66 IN

## 2021-01-22 LAB — CALCIUM SERPL-MCNC: 9.1 MG/DL (ref 8.5–10.1)

## 2021-01-22 PROCEDURE — 82310 ASSAY OF CALCIUM: CPT

## 2021-01-22 PROCEDURE — 74011250636 HC RX REV CODE- 250/636: Performed by: OTOLARYNGOLOGY

## 2021-01-22 PROCEDURE — 74011250637 HC RX REV CODE- 250/637: Performed by: OTOLARYNGOLOGY

## 2021-01-22 PROCEDURE — 99218 HC RM OBSERVATION: CPT

## 2021-01-22 PROCEDURE — 36415 COLL VENOUS BLD VENIPUNCTURE: CPT

## 2021-01-22 RX ORDER — OXYCODONE AND ACETAMINOPHEN 5; 325 MG/1; MG/1
2 TABLET ORAL
Qty: 30 TAB | Refills: 0 | Status: SHIPPED | OUTPATIENT
Start: 2021-01-22 | End: 2021-01-29

## 2021-01-22 RX ORDER — OXYCODONE AND ACETAMINOPHEN 5; 325 MG/1; MG/1
2 TABLET ORAL
Status: DISCONTINUED | OUTPATIENT
Start: 2021-01-22 | End: 2021-01-22 | Stop reason: HOSPADM

## 2021-01-22 RX ORDER — TRAMADOL HYDROCHLORIDE 50 MG/1
50 TABLET ORAL
Qty: 30 TAB | Refills: 0 | Status: SHIPPED | OUTPATIENT
Start: 2021-01-22 | End: 2021-01-29

## 2021-01-22 RX ORDER — DIPHENHYDRAMINE HCL 25 MG
50 CAPSULE ORAL
Status: DISCONTINUED | OUTPATIENT
Start: 2021-01-22 | End: 2021-01-22 | Stop reason: HOSPADM

## 2021-01-22 RX ORDER — LEVOTHYROXINE SODIUM 75 UG/1
200 TABLET ORAL
Qty: 30 TAB | Refills: 1 | Status: SHIPPED | OUTPATIENT
Start: 2021-01-22

## 2021-01-22 RX ADMIN — TRAMADOL HYDROCHLORIDE 50 MG: 50 TABLET, FILM COATED ORAL at 10:39

## 2021-01-22 RX ADMIN — DIPHENHYDRAMINE HYDROCHLORIDE 25 MG: 25 CAPSULE ORAL at 10:39

## 2021-01-22 RX ADMIN — MORPHINE SULFATE 2 MG: 2 INJECTION, SOLUTION INTRAMUSCULAR; INTRAVENOUS at 06:16

## 2021-01-22 RX ADMIN — Medication 10 ML: at 06:00

## 2021-01-22 RX ADMIN — LEVOTHYROXINE SODIUM 200 MCG: 0.12 TABLET ORAL at 06:31

## 2021-01-22 NOTE — PROGRESS NOTES
Bedside and Verbal shift change report given to Pilar Catalan RN (oncoming nurse) by Mray Hickman RN (offgoing nurse). Report included the following information SBAR, Kardex, Intake/Output and MAR.

## 2021-01-22 NOTE — OP NOTES
[address]   OPERATIVE REPORT    Name:  Swapna Rodarte  MR#:  [id number]  :  1980  ACCOUNT #:  [de-identified]  DATE OF SERVICE:  2021      PREOPERATIVE DIAGNOSIS:  Graves' thyroiditis. POSTOPERATIVE DIAGNOSIS:  Graves' thyroiditis. PROCEDURES PERFORMED:  Total thyroidectomy, nerve integrity monitoring x2 hours. SURGEON:  Vinh Al MD    ASSISTANT:  Surgical assistant. ANESTHESIA:  General endotracheal tube anesthesia. COMPLICATIONS:  No complications. SPECIMENS REMOVED:  Total thyroid, . IMPLANTS:  No implant. ESTIMATED BLOOD LOSS:  20 mL. INDICATIONS AND FINDINGS:  The patient has Graves' thyroiditis favoring surgical treatment as opposed to ongoing medical management related to potential complications and side effects of medicines and hence indications. Nerve integrity monitor was used and all 4 parathyroid glands visualized and healthy at the end of the case and course of laryngeal nerves intact with no nerve integrity monitor alarms at any time through the case. DETAILS OF PROCEDURE:  In the operating room, the patient was induced under general endotracheal tube anesthesia with a nerve integrity monitoring endotracheal tube. Prior to the surgery, with the patient upright, a suprasternal right side was marked out. A 6 cm incision was made in this centrally using Bovie cautery to dissect superior and inferior subplatysmal skin flaps retracted with silk sutures. The strap muscles were divided in the midline exposing the capsule of the thyroid. On both sides, the gland was dissected in an extracapsular plane with a Kittner, bipolar, and Castano dissector. The superior pole was skeletonized, dividing the vascular portion. The superior and inferior parathyroid glands were identified and peeled from the thyroid capsule preserving the inferolateral blood supply.   The recurrent laryngeal nerve was identified in the tracheoesophageal groove and followed into the cricothyroid membrane protecting its course with minimal distal dissection. The thyroid could then be divided from its inferior pole vasculature and peeled from the pretracheal fascia, handing off left then right lobe for pathologic analysis. The Valsalva maneuver was used to check hemostasis at the end and the neck was irrigated with saline. A #7 MIKE drain was secured on the left. 3-0 Vicryl was used to close the strap muscles in the midline using the same to close the platysma flaps following which the skin was closed with an intracuticular suture of 5-0 Monocryl reinforced with a Mastisol and Steri-Strips dressing. The patient was then handed over to Anesthesia for awakening and transferred to the recovery room.         Keanu King MD      [id number]  D:  01/21/2021 17:40  T:  01/22/2021 0:00  JOB #:  [id number]  CC:  [facility]

## 2021-01-22 NOTE — PROGRESS NOTES
1029: Spoke with Dr. Tashia Kim and he said I can remove the MIKE drain and wrap a curlex dressing around the incision. Patient is on IV Morphine, but we needed something oral for the patient to go home on and the Tramadol isn't working as well. Dr. Tashia Kim said to order Percocet even with the allergy. He also said to order the Benadryl for the itching. 10:47 - Patient is going to try the tramadol again to see if it would help with the pain better than it did yesterday. Also, gave the patient benadryl to help with the itching. Patient only wanted 25mg so that she wouldn't be too tired. 11:57 - Removed the MIKE drain from the patient's neck and apply gauze and curlex dressing. 1400 - Went over the discharge paperwork with the patient and provided opportunity for questions. Gave patient a copy of her discharge paperwork and removed her IV.

## 2021-01-22 NOTE — DISCHARGE INSTRUCTIONS
Patient Education        Thyroid Gland: Anatomy Sketch    Current as of: March 31, 2020               Content Version: 12.6  © 2006-2020 [employer]. Care instructions adapted under license by [employer] (which disclaims liability or warranty for this information). If you have questions about a medical condition or this instruction, always ask your healthcare professional. [address] any warranty or liability for your use of this information. Patient Education        Thyroidectomy: What to Expect at Home  Your Recovery     Thyroidectomy is the removal of the thyroid gland, which is shaped like a butterfly and lies across the windpipe (trachea). The gland makes hormones that control how your body makes and uses energy (metabolism). A doctor removes the gland when a tumor is present. The doctor may also remove the gland if you have an enlarged thyroid that is causing symptoms that bother you. Most tumors that grow in the thyroid gland are benign. This means they aren't cancer. You may leave the hospital with stitches in the cut (incision) the doctor made. Your doctor will tell you if you need to come back to have these removed. You may still have a tube called a drain in your neck. Your doctor will take this out a few days after your surgery. You may have some trouble chewing and swallowing after you go home. Your voice probably will be hoarse, and you may have trouble talking. For most people, these problems get better within 3 to 4 months, but it can take as long as a year. In some cases, this surgery causes permanent problems with chewing, speaking, or swallowing. This care sheet gives you a general idea about how long it will take for you to recover. But each person recovers at a different pace. Follow the steps below to get better as quickly as possible. How can you care for yourself at home? Activity    · Rest when you feel tired.  Getting enough sleep will help you recover. When you lie down, put two or three pillows under your head to keep it raised.     · Try to walk each day. Start by walking a little more than you did the day before. Bit by bit, increase the amount you walk. Walking boosts blood flow and helps prevent pneumonia and constipation.     · Avoid strenuous physical activity and lifting heavy objects for 3 weeks after surgery or until your doctor says it is okay.     · Do not over-extend your neck backwards for 2 weeks after surgery.     · Ask your doctor when you can drive again.     · You may take a shower, unless you still have a drain near your incision. Pat the incision dry. If you have a drain, follow your doctor's instructions to care for it. Diet    · If it is painful to swallow, start out with cold drinks, flavored ice pops, and ice cream. Next, try soft foods like pudding, yogurt, canned or cooked fruit, scrambled eggs, and mashed potatoes. Avoid eating hard or scratchy foods like chips or raw vegetables. Avoid orange or tomato juice and other acidic foods that can sting the throat.     · If you cough right after drinking, try drinking thicker liquids, such as a smoothie.     · You may notice that your bowel movements are not regular right after your surgery. This is common. Try to avoid constipation and straining with bowel movements. You may want to take a fiber supplement every day. If you have not had a bowel movement after a couple of days, ask your doctor about taking a mild laxative. Medicines    · Your doctor will tell you if and when you can restart your medicines. He or she will also give you instructions about taking any new medicines.     · If you take aspirin or some other blood thinner, ask your doctor if and when to start taking it again. Make sure that you understand exactly what your doctor wants you to do.     · Be safe with medicines. Take pain medicines exactly as directed.   ? If the doctor gave you a prescription medicine for pain, take it as prescribed. ? If you are not taking a prescription pain medicine, ask your doctor if you can take an over-the-counter medicine.     · If you think your pain medicine is making you sick to your stomach:  ? Take your medicine after meals (unless your doctor has told you not to). ? Ask your doctor for a different pain medicine.     · Your doctor may prescribe calcium to prevent problems after surgery from low calcium. Not having enough calcium can cause symptoms such as tingling around your mouth or in your hands and feet.     · Your doctor may have prescribed antibiotics. Take them as directed. Do not stop taking them just because you feel better. You need to take the full course of antibiotics. Incision care    · If your doctor told you how to care for your incision, follow your doctor's instructions. If you did not get instructions, follow this general advice:  ? After the first 24 to 48 hours, wash around the wound with clean water 2 times a day. Don't use hydrogen peroxide or alcohol, which can slow healing.     · You may have a drain near your incision. Your doctor will tell you how to take care of it. Follow-up care is a key part of your treatment and safety. Be sure to make and go to all appointments, and call your doctor if you are having problems. It's also a good idea to know your test results and keep a list of the medicines you take. When should you call for help? Call 911 anytime you think you may need emergency care. For example, call if:    · You passed out (lost consciousness).     · You have sudden chest pain and shortness of breath, or you cough up blood.     · You have severe trouble breathing.    Call your doctor now or seek immediate medical care if:    · You have loose stitches, or your incision comes open.     · Bleeding from your incision soaks through your bandages.     · You have signs of infection, such as:  ? Increased pain, swelling, warmth, or redness. ? Red streaks leading from the incision. ? Pus draining from the incision. ? A fever.     · You have a tingling feeling around your mouth.     · You have cramping or tingling in your hands and feet. Watch closely for changes in your health, and be sure to contact your doctor if:    · You have trouble talking.     · You are sick to your stomach or cannot keep fluids down.     · You do not have a bowel movement after taking a laxative. Where can you learn more? Go to http://www.gray.com/  Enter D009 in the search box to learn more about \"Thyroidectomy: What to Expect at Home. \"  Current as of: March 31, 2020               Content Version: 12.6  © 1524-6342 [employer]. Care instructions adapted under license by [employer] (which disclaims liability or warranty for this information). If you have questions about a medical condition or this instruction, always ask your healthcare professional. [place] [address] any warranty or liability for your use of this information.

## 2021-01-27 ENCOUNTER — OFFICE VISIT (OUTPATIENT)
Dept: FAMILY MEDICINE CLINIC | Age: 41
End: 2021-01-27

## 2021-01-27 VITALS
HEIGHT: 66 IN | HEART RATE: 71 BPM | WEIGHT: 277 LBS | SYSTOLIC BLOOD PRESSURE: 116 MMHG | RESPIRATION RATE: 16 BRPM | DIASTOLIC BLOOD PRESSURE: 78 MMHG | OXYGEN SATURATION: 98 % | BODY MASS INDEX: 44.52 KG/M2 | TEMPERATURE: 97.9 F

## 2021-01-27 NOTE — PROGRESS NOTES
Patient stated name &     Chief Complaint   Patient presents with    Follow-up     Yearly   Last seen 2019        Health Maintenance Due   Topic    COVID-19 Vaccine (1 of 2)    DTaP/Tdap/Td series (1 - Tdap)    A1C test (Diabetic or Prediabetic)     Flu Vaccine (1)       Wt Readings from Last 3 Encounters:   21 277 lb (125.6 kg)   21 279 lb 5.2 oz (126.7 kg)   20 279 lb 5.2 oz (126.7 kg)     Temp Readings from Last 3 Encounters:   21 97.9 °F (36.6 °C) (Oral)   21 98.6 °F (37 °C)   20 98 °F (36.7 °C)     BP Readings from Last 3 Encounters:   21 116/78   21 114/77   20 137/81     Pulse Readings from Last 3 Encounters:   21 71   21 80   20 79         Learning Assessment:  :     Learning Assessment 1/10/2020 2016   PRIMARY LEARNER Patient Patient   BARRIERS PRIMARY LEARNER - NONE   PRIMARY LANGUAGE ENGLISH ENGLISH   LEARNER PREFERENCE PRIMARY DEMONSTRATION DEMONSTRATION   ANSWERED BY patient  self   RELATIONSHIP SELF SELF       Depression Screening:  :     3 most recent PHQ Screens 2021   Little interest or pleasure in doing things Not at all   Feeling down, depressed, irritable, or hopeless Not at all   Total Score PHQ 2 0       Fall Risk Assessment:  :     No flowsheet data found. Abuse Screening:  :     No flowsheet data found. Coordination of Care Questionnaire:  :     1) Have you been to an emergency room, urgent care clinic since your last visit? No    Hospitalized since your last visit? [facility]  Thyroidectomy   Discharged 21             2) Have you seen or consulted any other health care providers outside of [address] [place] [address] since your last visit? No  (Include any pap smears or colon screenings in this section.)    Patient is accompanied by self I have received verbal consent from Everardo Robledo to discuss any/all medical information while they are present in the room.

## 2021-04-26 ENCOUNTER — TELEPHONE (OUTPATIENT)
Dept: RHEUMATOLOGY | Age: 41
End: 2021-04-26

## 2021-04-26 NOTE — TELEPHONE ENCOUNTER
----- Message from Channing Shea RN sent at 4/26/2021  1:41 PM EDT -----  Regarding: FW: Dr Mónica Nevarez    ----- Message -----  From: Nita Collins: 4/26/2021   1:36 PM EDT  To: [facility] Nurse Pool  Subject: Dr Rayray Sena (if not patient):Kristal/ [facility]      Relationship of caller (if not patient):      Best contact number(s):[phone]      Name of medication and dosage if known: Humira  40 mg/0.4kml      Is patient out of this medication (yes/no):      Pharmacy name:[facility]      Pharmacy listed in chart? (yes/no):  Pharmacy phone number: [phone]      Details to clarify the request:(f) [phone]      [employer]

## 2021-05-04 DIAGNOSIS — M45.9 ANKYLOSING SPONDYLITIS, UNSPECIFIED SITE OF SPINE (HCC): ICD-10-CM

## 2021-05-04 RX ORDER — ADALIMUMAB 40MG/0.4ML
KIT SUBCUTANEOUS
Qty: 1 KIT | Refills: 4 | OUTPATIENT
Start: 2021-05-04

## 2021-05-04 NOTE — TELEPHONE ENCOUNTER
Nearly a year since last visit, she was notified on 4/26 to schedule an appointment, once she schedules an appointment I'll prescribe her enough Humira to last until she can be seen, though it's possible her insurance will require her to be seen anyway by me before I can extend her prescription

## 2022-03-18 PROBLEM — E06.3 AUTOIMMUNE THYROIDITIS: Status: ACTIVE | Noted: 2021-01-21

## 2022-03-19 PROBLEM — E66.01 OBESITY, MORBID (HCC): Status: ACTIVE | Noted: 2019-09-26

## 2022-03-19 PROBLEM — E05.00 GRAVES' DISEASE WITHOUT CRISIS: Status: ACTIVE | Noted: 2021-01-21

## 2022-03-19 PROBLEM — E05.00 GRAVES DISEASE: Status: ACTIVE | Noted: 2021-01-21

## (undated) DEVICE — GOWN,AURORA,NON-REINFORCED,2XL: Brand: MEDLINE

## (undated) DEVICE — SURGICAL PROCEDURE PACK BASIN MAJ SET CUST NO CAUT

## (undated) DEVICE — NEEDLE HYPO 25GA L1.5IN BLU POLYPR HUB S STL REG BVL STR

## (undated) DEVICE — SYR 10ML CTRL LR LCK NSAF LF --

## (undated) DEVICE — INSULATED BLADE ELECTRODE: Brand: EDGE

## (undated) DEVICE — DRAIN SURG FLAT W7MMXL20CM FULL PERF

## (undated) DEVICE — 40418 TRENDELENBURG ONE-STEP ARM PROTECTORS LARGE (1 PAIR): Brand: 40418 TRENDELENBURG ONE-STEP ARM PROTECTORS LARGE (1 PAIR)

## (undated) DEVICE — RESERVOIR,SUCTION,100CC,SILICONE: Brand: MEDLINE

## (undated) DEVICE — TOWEL SURG W17XL27IN STD BLU COT NONFENESTRATED PREWASHED

## (undated) DEVICE — STRAP,POSITIONING,KNEE/BODY,FOAM,4X60": Brand: MEDLINE

## (undated) DEVICE — SUTURE PERMAHAND SZ 2-0 L18IN NONABSORBABLE BLK L26MM PS 1588H

## (undated) DEVICE — ELECTRO LUBE IS A SINGLE PATIENT USE DEVICE THAT IS INTENDED TO BE USED ON ELECTROSURGICAL ELECTRODES TO REDUCE STICKING.: Brand: KEY SURGICAL ELECTRO LUBE

## (undated) DEVICE — PACK,EENT,TURBAN DRAPE,PK II: Brand: MEDLINE

## (undated) DEVICE — BIPOLAR FORCEPS CORD: Brand: VALLEYLAB

## (undated) DEVICE — SUTURE VCRL SZ 3-0 L27IN ABSRB UD L26MM SH 1/2 CIR J416H

## (undated) DEVICE — STRIP,CLOSURE,WOUND,MEDI-STRIP,1/2X4: Brand: MEDLINE

## (undated) DEVICE — INFECTION CONTROL KIT SYS

## (undated) DEVICE — TOTAL TRAY, DB, 100% SILI FOLEY, 16FR 10: Brand: MEDLINE

## (undated) DEVICE — INTENDED FOR TISSUE SEPARATION, AND OTHER PROCEDURES THAT REQUIRE A SHARP SURGICAL BLADE TO PUNCTURE OR CUT.: Brand: BARD-PARKER ® CARBON RIB-BACK BLADES

## (undated) DEVICE — ROCKER SWITCH PENCIL BLADE ELECTRODE, HOLSTER: Brand: EDGE

## (undated) DEVICE — REM POLYHESIVE ADULT PATIENT RETURN ELECTRODE: Brand: VALLEYLAB

## (undated) DEVICE — SOLUTION IRRIG 1000ML H2O STRL BLT

## (undated) DEVICE — SPONGE: SPECIALTY PEANUT XR 100/CS: Brand: MEDICAL ACTION INDUSTRIES

## (undated) DEVICE — MASTISOL ADHESIVE LIQ 2/3ML

## (undated) DEVICE — SOLUTION IV 1000ML 0.9% SOD CHL

## (undated) DEVICE — GARMENT,MEDLINE,DVT,INT,CALF,MED, GEN2: Brand: MEDLINE

## (undated) DEVICE — TRAY PREP DRY W/ PREM GLV 2 APPL 6 SPNG 2 UNDPD 1 OVERWRAP